# Patient Record
Sex: MALE | Race: WHITE | NOT HISPANIC OR LATINO | Employment: OTHER | URBAN - METROPOLITAN AREA
[De-identification: names, ages, dates, MRNs, and addresses within clinical notes are randomized per-mention and may not be internally consistent; named-entity substitution may affect disease eponyms.]

---

## 2019-11-11 ENCOUNTER — HOSPITAL ENCOUNTER (INPATIENT)
Facility: HOSPITAL | Age: 44
LOS: 7 days | Discharge: HOME | DRG: 871 | End: 2019-11-18
Attending: EMERGENCY MEDICINE | Admitting: HOSPITALIST
Payer: MEDICAID

## 2019-11-11 DIAGNOSIS — A41.9 SEPSIS: ICD-10-CM

## 2019-11-11 DIAGNOSIS — R07.9 CHEST PAIN: ICD-10-CM

## 2019-11-11 DIAGNOSIS — A41.9 SEPSIS, DUE TO UNSPECIFIED ORGANISM, UNSPECIFIED WHETHER ACUTE ORGAN DYSFUNCTION PRESENT: ICD-10-CM

## 2019-11-11 DIAGNOSIS — R74.8 ELEVATED LIVER ENZYMES: ICD-10-CM

## 2019-11-11 DIAGNOSIS — J96.01 ACUTE HYPOXEMIC RESPIRATORY FAILURE: ICD-10-CM

## 2019-11-11 DIAGNOSIS — J18.9 PNEUMONIA OF LEFT LUNG DUE TO INFECTIOUS ORGANISM, UNSPECIFIED PART OF LUNG: ICD-10-CM

## 2019-11-11 DIAGNOSIS — R09.1 PLEURISY: ICD-10-CM

## 2019-11-11 DIAGNOSIS — Z72.0 TOBACCO ABUSE: ICD-10-CM

## 2019-11-11 DIAGNOSIS — R76.8 HEPATITIS C ANTIBODY POSITIVE IN BLOOD: ICD-10-CM

## 2019-11-11 DIAGNOSIS — R00.0 TACHYCARDIA: ICD-10-CM

## 2019-11-11 DIAGNOSIS — R65.21 SEPTIC SHOCK: Primary | ICD-10-CM

## 2019-11-11 DIAGNOSIS — A41.9 SEPTIC SHOCK: Primary | ICD-10-CM

## 2019-11-11 DIAGNOSIS — I38 ENDOCARDITIS: ICD-10-CM

## 2019-11-11 LAB
BASOPHILS # BLD AUTO: 0.08 K/UL (ref 0–0.2)
BASOPHILS NFR BLD: 0.4 % (ref 0–1.9)
CTP QC/QA: YES
DIFFERENTIAL METHOD: ABNORMAL
EOSINOPHIL # BLD AUTO: 0 K/UL (ref 0–0.5)
EOSINOPHIL NFR BLD: 0.1 % (ref 0–8)
ERYTHROCYTE [DISTWIDTH] IN BLOOD BY AUTOMATED COUNT: 14.5 % (ref 11.5–14.5)
HCT VFR BLD AUTO: 38.7 % (ref 40–54)
HGB BLD-MCNC: 13 G/DL (ref 14–18)
HIV1+2 IGG SERPL QL IA.RAPID: NEGATIVE
IMM GRANULOCYTES # BLD AUTO: 0.26 K/UL (ref 0–0.04)
IMM GRANULOCYTES NFR BLD AUTO: 1.2 % (ref 0–0.5)
LACTATE SERPL-SCNC: 3.2 MMOL/L (ref 0.5–2.2)
LYMPHOCYTES # BLD AUTO: 1.5 K/UL (ref 1–4.8)
LYMPHOCYTES NFR BLD: 6.9 % (ref 18–48)
MCH RBC QN AUTO: 29 PG (ref 27–31)
MCHC RBC AUTO-ENTMCNC: 33.6 G/DL (ref 32–36)
MCV RBC AUTO: 86 FL (ref 82–98)
MONOCYTES # BLD AUTO: 1.4 K/UL (ref 0.3–1)
MONOCYTES NFR BLD: 6.3 % (ref 4–15)
NEUTROPHILS # BLD AUTO: 18.3 K/UL (ref 1.8–7.7)
NEUTROPHILS NFR BLD: 85.1 % (ref 38–73)
NRBC BLD-RTO: 0 /100 WBC
PLATELET # BLD AUTO: 423 K/UL (ref 150–350)
PMV BLD AUTO: 11 FL (ref 9.2–12.9)
POC MOLECULAR INFLUENZA A AGN: NEGATIVE
POC MOLECULAR INFLUENZA B AGN: NEGATIVE
RBC # BLD AUTO: 4.49 M/UL (ref 4.6–6.2)
TROPONIN I SERPL DL<=0.01 NG/ML-MCNC: <0.006 NG/ML (ref 0–0.03)
WBC # BLD AUTO: 21.51 K/UL (ref 3.9–12.7)

## 2019-11-11 PROCEDURE — 99291 CRITICAL CARE FIRST HOUR: CPT | Mod: ,,, | Performed by: EMERGENCY MEDICINE

## 2019-11-11 PROCEDURE — 96366 THER/PROPH/DIAG IV INF ADDON: CPT

## 2019-11-11 PROCEDURE — 84484 ASSAY OF TROPONIN QUANT: CPT

## 2019-11-11 PROCEDURE — 96375 TX/PRO/DX INJ NEW DRUG ADDON: CPT

## 2019-11-11 PROCEDURE — 93010 ELECTROCARDIOGRAM REPORT: CPT | Mod: ,,, | Performed by: INTERNAL MEDICINE

## 2019-11-11 PROCEDURE — 96365 THER/PROPH/DIAG IV INF INIT: CPT

## 2019-11-11 PROCEDURE — 85025 COMPLETE CBC W/AUTO DIFF WBC: CPT

## 2019-11-11 PROCEDURE — 87040 BLOOD CULTURE FOR BACTERIA: CPT | Mod: 59

## 2019-11-11 PROCEDURE — 25000003 PHARM REV CODE 250: Performed by: PHYSICIAN ASSISTANT

## 2019-11-11 PROCEDURE — 93010 EKG 12-LEAD: ICD-10-PCS | Mod: ,,, | Performed by: INTERNAL MEDICINE

## 2019-11-11 PROCEDURE — 83605 ASSAY OF LACTIC ACID: CPT

## 2019-11-11 PROCEDURE — 86703 HIV-1/HIV-2 1 RESULT ANTBDY: CPT

## 2019-11-11 PROCEDURE — 63600175 PHARM REV CODE 636 W HCPCS: Performed by: PHYSICIAN ASSISTANT

## 2019-11-11 PROCEDURE — 93005 ELECTROCARDIOGRAM TRACING: CPT

## 2019-11-11 PROCEDURE — 96361 HYDRATE IV INFUSION ADD-ON: CPT

## 2019-11-11 PROCEDURE — 96368 THER/DIAG CONCURRENT INF: CPT

## 2019-11-11 PROCEDURE — 87502 INFLUENZA DNA AMP PROBE: CPT

## 2019-11-11 PROCEDURE — 99291 CRITICAL CARE FIRST HOUR: CPT | Mod: 25

## 2019-11-11 PROCEDURE — 12000002 HC ACUTE/MED SURGE SEMI-PRIVATE ROOM

## 2019-11-11 PROCEDURE — 99291 PR CRITICAL CARE, E/M 30-74 MINUTES: ICD-10-PCS | Mod: ,,, | Performed by: EMERGENCY MEDICINE

## 2019-11-11 RX ORDER — KETOROLAC TROMETHAMINE 30 MG/ML
10 INJECTION, SOLUTION INTRAMUSCULAR; INTRAVENOUS
Status: COMPLETED | OUTPATIENT
Start: 2019-11-11 | End: 2019-11-11

## 2019-11-11 RX ORDER — ONDANSETRON 2 MG/ML
4 INJECTION INTRAMUSCULAR; INTRAVENOUS
Status: COMPLETED | OUTPATIENT
Start: 2019-11-11 | End: 2019-11-11

## 2019-11-11 RX ORDER — VANCOMYCIN HCL IN 5 % DEXTROSE 1G/250ML
15 PLASTIC BAG, INJECTION (ML) INTRAVENOUS
Status: COMPLETED | OUTPATIENT
Start: 2019-11-11 | End: 2019-11-12

## 2019-11-11 RX ORDER — CEFEPIME HYDROCHLORIDE 2 G/1
2 INJECTION, POWDER, FOR SOLUTION INTRAVENOUS
Status: COMPLETED | OUTPATIENT
Start: 2019-11-11 | End: 2019-11-11

## 2019-11-11 RX ORDER — ACETAMINOPHEN 500 MG
1000 TABLET ORAL
Status: COMPLETED | OUTPATIENT
Start: 2019-11-11 | End: 2019-11-11

## 2019-11-11 RX ADMIN — ONDANSETRON 4 MG: 2 INJECTION INTRAMUSCULAR; INTRAVENOUS at 10:11

## 2019-11-11 RX ADMIN — KETOROLAC TROMETHAMINE 10 MG: 30 INJECTION, SOLUTION INTRAMUSCULAR; INTRAVENOUS at 10:11

## 2019-11-11 RX ADMIN — CEFEPIME 2 G: 2 INJECTION, POWDER, FOR SOLUTION INTRAVENOUS at 11:11

## 2019-11-11 RX ADMIN — ACETAMINOPHEN 1000 MG: 500 TABLET ORAL at 10:11

## 2019-11-11 RX ADMIN — SODIUM CHLORIDE 1770 ML: 0.9 INJECTION, SOLUTION INTRAVENOUS at 10:11

## 2019-11-11 RX ADMIN — VANCOMYCIN HYDROCHLORIDE 1000 MG: 1 INJECTION, POWDER, LYOPHILIZED, FOR SOLUTION INTRAVENOUS at 11:11

## 2019-11-12 PROBLEM — A41.9 SEPSIS: Status: ACTIVE | Noted: 2019-11-12

## 2019-11-12 PROBLEM — R65.21 SEPTIC SHOCK: Status: ACTIVE | Noted: 2019-11-12

## 2019-11-12 PROBLEM — A41.9 SEPTIC SHOCK: Status: ACTIVE | Noted: 2019-11-12

## 2019-11-12 LAB
ADENOVIRUS: NOT DETECTED
ALBUMIN SERPL BCP-MCNC: 1.7 G/DL (ref 3.5–5.2)
ALBUMIN SERPL BCP-MCNC: 1.7 G/DL (ref 3.5–5.2)
ALP SERPL-CCNC: 140 U/L (ref 55–135)
ALP SERPL-CCNC: 173 U/L (ref 55–135)
ALT SERPL W/O P-5'-P-CCNC: 68 U/L (ref 10–44)
ALT SERPL W/O P-5'-P-CCNC: 96 U/L (ref 10–44)
ANION GAP SERPL CALC-SCNC: 10 MMOL/L (ref 8–16)
ANION GAP SERPL CALC-SCNC: 9 MMOL/L (ref 8–16)
ANISOCYTOSIS BLD QL SMEAR: SLIGHT
ASCENDING AORTA: 3.32 CM
AST SERPL-CCNC: 107 U/L (ref 10–40)
AST SERPL-CCNC: 36 U/L (ref 10–40)
AV INDEX (PROSTH): 1.09
AV MEAN GRADIENT: 4 MMHG
AV PEAK GRADIENT: 8 MMHG
AV VALVE AREA: 3.93 CM2
AV VELOCITY RATIO: 0.83
BASOPHILS # BLD AUTO: 0.08 K/UL (ref 0–0.2)
BASOPHILS NFR BLD: 0.3 % (ref 0–1.9)
BILIRUB SERPL-MCNC: 0.6 MG/DL (ref 0.1–1)
BILIRUB SERPL-MCNC: 0.9 MG/DL (ref 0.1–1)
BILIRUB UR QL STRIP: NEGATIVE
BORDETELLA PARAPERTUSSIS (IS1001): NOT DETECTED
BORDETELLA PERTUSSIS (PTXP): NOT DETECTED
BSA FOR ECHO PROCEDURE: 1.64 M2
BUN SERPL-MCNC: 10 MG/DL (ref 6–20)
BUN SERPL-MCNC: 7 MG/DL (ref 6–20)
BURR CELLS BLD QL SMEAR: ABNORMAL
CALCIUM SERPL-MCNC: 7.6 MG/DL (ref 8.7–10.5)
CALCIUM SERPL-MCNC: 8 MG/DL (ref 8.7–10.5)
CHLAMYDIA PNEUMONIAE: NOT DETECTED
CHLORIDE SERPL-SCNC: 104 MMOL/L (ref 95–110)
CHLORIDE SERPL-SCNC: 99 MMOL/L (ref 95–110)
CLARITY UR REFRACT.AUTO: CLEAR
CO2 SERPL-SCNC: 21 MMOL/L (ref 23–29)
CO2 SERPL-SCNC: 21 MMOL/L (ref 23–29)
COLOR UR AUTO: YELLOW
CORONAVIRUS 229E, COMMON COLD VIRUS: NOT DETECTED
CORONAVIRUS HKU1, COMMON COLD VIRUS: NOT DETECTED
CORONAVIRUS NL63, COMMON COLD VIRUS: NOT DETECTED
CORONAVIRUS OC43, COMMON COLD VIRUS: NOT DETECTED
CREAT SERPL-MCNC: 0.7 MG/DL (ref 0.5–1.4)
CREAT SERPL-MCNC: 0.7 MG/DL (ref 0.5–1.4)
CV ECHO LV RWT: 0.34 CM
DIFFERENTIAL METHOD: ABNORMAL
DOP CALC AO PEAK VEL: 1.37 M/S
DOP CALC AO VTI: 17.25 CM
DOP CALC LVOT AREA: 3.6 CM2
DOP CALC LVOT DIAMETER: 2.14 CM
DOP CALC LVOT PEAK VEL: 1.14 M/S
DOP CALC LVOT STROKE VOLUME: 67.84 CM3
DOP CALCLVOT PEAK VEL VTI: 18.87 CM
E WAVE DECELERATION TIME: 146.21 MSEC
E/A RATIO: 1
E/E' RATIO: 6.93 M/S
ECHO LV POSTERIOR WALL: 0.69 CM (ref 0.6–1.1)
EOSINOPHIL # BLD AUTO: 0.1 K/UL (ref 0–0.5)
EOSINOPHIL NFR BLD: 0.3 % (ref 0–8)
ERYTHROCYTE [DISTWIDTH] IN BLOOD BY AUTOMATED COUNT: 14.3 % (ref 11.5–14.5)
EST. GFR  (AFRICAN AMERICAN): >60 ML/MIN/1.73 M^2
EST. GFR  (AFRICAN AMERICAN): >60 ML/MIN/1.73 M^2
EST. GFR  (NON AFRICAN AMERICAN): >60 ML/MIN/1.73 M^2
EST. GFR  (NON AFRICAN AMERICAN): >60 ML/MIN/1.73 M^2
FLUBV RNA NPH QL NAA+NON-PROBE: NOT DETECTED
FRACTIONAL SHORTENING: 28 % (ref 28–44)
GLUCOSE SERPL-MCNC: 116 MG/DL (ref 70–110)
GLUCOSE SERPL-MCNC: 119 MG/DL (ref 70–110)
GLUCOSE UR QL STRIP: NEGATIVE
HCT VFR BLD AUTO: 38.1 % (ref 40–54)
HGB BLD-MCNC: 12.4 G/DL (ref 14–18)
HGB UR QL STRIP: NEGATIVE
HPIV1 RNA NPH QL NAA+NON-PROBE: NOT DETECTED
HPIV2 RNA NPH QL NAA+NON-PROBE: NOT DETECTED
HPIV3 RNA NPH QL NAA+NON-PROBE: NOT DETECTED
HPIV4 RNA NPH QL NAA+NON-PROBE: NOT DETECTED
HUMAN METAPNEUMOVIRUS: NOT DETECTED
IMM GRANULOCYTES # BLD AUTO: 0.33 K/UL (ref 0–0.04)
IMM GRANULOCYTES NFR BLD AUTO: 1.3 % (ref 0–0.5)
INFLUENZA A (SUBTYPES H1,H1-2009,H3): NOT DETECTED
INTERVENTRICULAR SEPTUM: 0.71 CM (ref 0.6–1.1)
KETONES UR QL STRIP: NEGATIVE
LA MAJOR: 4.53 CM
LA MINOR: 4.62 CM
LA WIDTH: 3.83 CM
LACTATE SERPL-SCNC: 2 MMOL/L (ref 0.5–2.2)
LEFT ATRIUM SIZE: 3.21 CM
LEFT ATRIUM VOLUME INDEX: 29 ML/M2
LEFT ATRIUM VOLUME: 47.8 CM3
LEFT INTERNAL DIMENSION IN SYSTOLE: 2.92 CM (ref 2.1–4)
LEFT VENTRICLE DIASTOLIC VOLUME INDEX: 44.34 ML/M2
LEFT VENTRICLE DIASTOLIC VOLUME: 73.03 ML
LEFT VENTRICLE MASS INDEX: 49 G/M2
LEFT VENTRICLE SYSTOLIC VOLUME INDEX: 20 ML/M2
LEFT VENTRICLE SYSTOLIC VOLUME: 32.88 ML
LEFT VENTRICULAR INTERNAL DIMENSION IN DIASTOLE: 4.07 CM (ref 3.5–6)
LEFT VENTRICULAR MASS: 80.68 G
LEUKOCYTE ESTERASE UR QL STRIP: NEGATIVE
LV LATERAL E/E' RATIO: 6.06 M/S
LV SEPTAL E/E' RATIO: 8.08 M/S
LYMPHOCYTES # BLD AUTO: 1.6 K/UL (ref 1–4.8)
LYMPHOCYTES NFR BLD: 6.3 % (ref 18–48)
MCH RBC QN AUTO: 28.4 PG (ref 27–31)
MCHC RBC AUTO-ENTMCNC: 32.5 G/DL (ref 32–36)
MCV RBC AUTO: 87 FL (ref 82–98)
MONOCYTES # BLD AUTO: 1.4 K/UL (ref 0.3–1)
MONOCYTES NFR BLD: 5.6 % (ref 4–15)
MV PEAK A VEL: 0.97 M/S
MV PEAK E VEL: 0.97 M/S
MYCOPLASMA PNEUMONIAE: NOT DETECTED
NEUTROPHILS # BLD AUTO: 21.6 K/UL (ref 1.8–7.7)
NEUTROPHILS NFR BLD: 86.2 % (ref 38–73)
NITRITE UR QL STRIP: NEGATIVE
NRBC BLD-RTO: 0 /100 WBC
OVALOCYTES BLD QL SMEAR: ABNORMAL
PH UR STRIP: 6 [PH] (ref 5–8)
PISA TR MAX VEL: 1.74 M/S
PLATELET # BLD AUTO: 421 K/UL (ref 150–350)
PLATELET BLD QL SMEAR: ABNORMAL
PMV BLD AUTO: 9.1 FL (ref 9.2–12.9)
POIKILOCYTOSIS BLD QL SMEAR: SLIGHT
POTASSIUM SERPL-SCNC: 3 MMOL/L (ref 3.5–5.1)
POTASSIUM SERPL-SCNC: 3.7 MMOL/L (ref 3.5–5.1)
PROT SERPL-MCNC: 6 G/DL (ref 6–8.4)
PROT SERPL-MCNC: 6.1 G/DL (ref 6–8.4)
PROT UR QL STRIP: NEGATIVE
PULM VEIN S/D RATIO: 0.8
PV PEAK D VEL: 0.98 M/S
PV PEAK S VEL: 0.78 M/S
RA MAJOR: 3.12 CM
RA PRESSURE: 3 MMHG
RA WIDTH: 3.27 CM
RBC # BLD AUTO: 4.37 M/UL (ref 4.6–6.2)
RESPIRATORY INFECTION PANEL SOURCE: NORMAL
RIGHT VENTRICULAR END-DIASTOLIC DIMENSION: 3.59 CM
RSV RNA NPH QL NAA+NON-PROBE: NOT DETECTED
RV TISSUE DOPPLER FREE WALL SYSTOLIC VELOCITY 1 (APICAL 4 CHAMBER VIEW): 17.1 CM/S
RV+EV RNA NPH QL NAA+NON-PROBE: NOT DETECTED
SINUS: 3.27 CM
SODIUM SERPL-SCNC: 130 MMOL/L (ref 136–145)
SODIUM SERPL-SCNC: 134 MMOL/L (ref 136–145)
SP GR UR STRIP: 1.01 (ref 1–1.03)
STJ: 2.77 CM
TDI LATERAL: 0.16 M/S
TDI SEPTAL: 0.12 M/S
TDI: 0.14 M/S
TOXIC GRANULES BLD QL SMEAR: PRESENT
TR MAX PG: 12 MMHG
TRICUSPID ANNULAR PLANE SYSTOLIC EXCURSION: 2.85 CM
TROPONIN I SERPL DL<=0.01 NG/ML-MCNC: <0.006 NG/ML (ref 0–0.03)
TV REST PULMONARY ARTERY PRESSURE: 15 MMHG
URN SPEC COLLECT METH UR: NORMAL
WBC # BLD AUTO: 25.06 K/UL (ref 3.9–12.7)
WBC TOXIC VACUOLES BLD QL SMEAR: PRESENT

## 2019-11-12 PROCEDURE — 63600175 PHARM REV CODE 636 W HCPCS: Performed by: STUDENT IN AN ORGANIZED HEALTH CARE EDUCATION/TRAINING PROGRAM

## 2019-11-12 PROCEDURE — 87449 NOS EACH ORGANISM AG IA: CPT

## 2019-11-12 PROCEDURE — 63600175 PHARM REV CODE 636 W HCPCS: Performed by: INTERNAL MEDICINE

## 2019-11-12 PROCEDURE — 87798 DETECT AGENT NOS DNA AMP: CPT

## 2019-11-12 PROCEDURE — 85025 COMPLETE CBC W/AUTO DIFF WBC: CPT

## 2019-11-12 PROCEDURE — 94761 N-INVAS EAR/PLS OXIMETRY MLT: CPT

## 2019-11-12 PROCEDURE — 63600175 PHARM REV CODE 636 W HCPCS: Performed by: PHYSICIAN ASSISTANT

## 2019-11-12 PROCEDURE — 99233 SBSQ HOSP IP/OBS HIGH 50: CPT | Mod: ,,, | Performed by: INTERNAL MEDICINE

## 2019-11-12 PROCEDURE — 87116 MYCOBACTERIA CULTURE: CPT

## 2019-11-12 PROCEDURE — 84484 ASSAY OF TROPONIN QUANT: CPT

## 2019-11-12 PROCEDURE — 27000221 HC OXYGEN, UP TO 24 HOURS

## 2019-11-12 PROCEDURE — 83605 ASSAY OF LACTIC ACID: CPT

## 2019-11-12 PROCEDURE — 80053 COMPREHEN METABOLIC PANEL: CPT | Mod: 91

## 2019-11-12 PROCEDURE — 25000003 PHARM REV CODE 250: Performed by: STUDENT IN AN ORGANIZED HEALTH CARE EDUCATION/TRAINING PROGRAM

## 2019-11-12 PROCEDURE — 20600001 HC STEP DOWN PRIVATE ROOM

## 2019-11-12 PROCEDURE — 87015 SPECIMEN INFECT AGNT CONCNTJ: CPT

## 2019-11-12 PROCEDURE — 87899 AGENT NOS ASSAY W/OPTIC: CPT

## 2019-11-12 PROCEDURE — 25000003 PHARM REV CODE 250: Performed by: PHYSICIAN ASSISTANT

## 2019-11-12 PROCEDURE — 86480 TB TEST CELL IMMUN MEASURE: CPT

## 2019-11-12 PROCEDURE — 81003 URINALYSIS AUTO W/O SCOPE: CPT

## 2019-11-12 PROCEDURE — 87206 SMEAR FLUORESCENT/ACID STAI: CPT

## 2019-11-12 PROCEDURE — 99233 PR SUBSEQUENT HOSPITAL CARE,LEVL III: ICD-10-PCS | Mod: ,,, | Performed by: INTERNAL MEDICINE

## 2019-11-12 PROCEDURE — 87040 BLOOD CULTURE FOR BACTERIA: CPT

## 2019-11-12 PROCEDURE — 80053 COMPREHEN METABOLIC PANEL: CPT

## 2019-11-12 RX ORDER — ACETAMINOPHEN 325 MG/1
650 TABLET ORAL EVERY 4 HOURS PRN
Status: DISCONTINUED | OUTPATIENT
Start: 2019-11-12 | End: 2019-11-13

## 2019-11-12 RX ORDER — POTASSIUM CHLORIDE 20 MEQ/15ML
40 SOLUTION ORAL
Status: COMPLETED | OUTPATIENT
Start: 2019-11-12 | End: 2019-11-12

## 2019-11-12 RX ORDER — SODIUM CHLORIDE 0.9 % (FLUSH) 0.9 %
10 SYRINGE (ML) INJECTION
Status: DISCONTINUED | OUTPATIENT
Start: 2019-11-12 | End: 2019-11-18 | Stop reason: HOSPADM

## 2019-11-12 RX ORDER — RAMELTEON 8 MG/1
8 TABLET ORAL NIGHTLY PRN
Status: DISCONTINUED | OUTPATIENT
Start: 2019-11-12 | End: 2019-11-18 | Stop reason: HOSPADM

## 2019-11-12 RX ORDER — KETOROLAC TROMETHAMINE 15 MG/ML
15 INJECTION, SOLUTION INTRAMUSCULAR; INTRAVENOUS EVERY 6 HOURS PRN
Status: DISCONTINUED | OUTPATIENT
Start: 2019-11-12 | End: 2019-11-12 | Stop reason: SDUPTHER

## 2019-11-12 RX ORDER — SODIUM CHLORIDE 0.9 % (FLUSH) 0.9 %
10 SYRINGE (ML) INJECTION
Status: CANCELLED | OUTPATIENT
Start: 2019-11-12

## 2019-11-12 RX ORDER — AZITHROMYCIN 250 MG/1
500 TABLET, FILM COATED ORAL NIGHTLY
Status: COMPLETED | OUTPATIENT
Start: 2019-11-12 | End: 2019-11-13

## 2019-11-12 RX ORDER — VANCOMYCIN HCL IN 5 % DEXTROSE 1G/250ML
1000 PLASTIC BAG, INJECTION (ML) INTRAVENOUS
Status: DISCONTINUED | OUTPATIENT
Start: 2019-11-12 | End: 2019-11-14

## 2019-11-12 RX ORDER — ONDANSETRON 2 MG/ML
4 INJECTION INTRAMUSCULAR; INTRAVENOUS EVERY 12 HOURS PRN
Status: DISCONTINUED | OUTPATIENT
Start: 2019-11-12 | End: 2019-11-18 | Stop reason: HOSPADM

## 2019-11-12 RX ORDER — KETOROLAC TROMETHAMINE 15 MG/ML
15 INJECTION, SOLUTION INTRAMUSCULAR; INTRAVENOUS EVERY 6 HOURS PRN
Status: DISPENSED | OUTPATIENT
Start: 2019-11-12 | End: 2019-11-15

## 2019-11-12 RX ORDER — ONDANSETRON 8 MG/1
8 TABLET, ORALLY DISINTEGRATING ORAL EVERY 8 HOURS PRN
Status: DISCONTINUED | OUTPATIENT
Start: 2019-11-12 | End: 2019-11-18 | Stop reason: HOSPADM

## 2019-11-12 RX ORDER — NOREPINEPHRINE BITARTRATE/D5W 4MG/250ML
0.05 PLASTIC BAG, INJECTION (ML) INTRAVENOUS CONTINUOUS
Status: DISCONTINUED | OUTPATIENT
Start: 2019-11-12 | End: 2019-11-12

## 2019-11-12 RX ORDER — ENOXAPARIN SODIUM 100 MG/ML
40 INJECTION SUBCUTANEOUS EVERY 24 HOURS
Status: DISCONTINUED | OUTPATIENT
Start: 2019-11-12 | End: 2019-11-18 | Stop reason: HOSPADM

## 2019-11-12 RX ORDER — CEFEPIME HYDROCHLORIDE 2 G/1
2 INJECTION, POWDER, FOR SOLUTION INTRAVENOUS
Status: DISCONTINUED | OUTPATIENT
Start: 2019-11-12 | End: 2019-11-14

## 2019-11-12 RX ADMIN — CEFEPIME 2 G: 2 INJECTION, POWDER, FOR SOLUTION INTRAVENOUS at 10:11

## 2019-11-12 RX ADMIN — VANCOMYCIN HYDROCHLORIDE 1000 MG: 1 INJECTION, POWDER, FOR SOLUTION INTRAVENOUS at 11:11

## 2019-11-12 RX ADMIN — ENOXAPARIN SODIUM 40 MG: 100 INJECTION SUBCUTANEOUS at 04:11

## 2019-11-12 RX ADMIN — KETOROLAC TROMETHAMINE 15 MG: 15 INJECTION, SOLUTION INTRAMUSCULAR; INTRAVENOUS at 05:11

## 2019-11-12 RX ADMIN — POTASSIUM CHLORIDE 40 MEQ: 20 SOLUTION ORAL at 08:11

## 2019-11-12 RX ADMIN — SODIUM CHLORIDE, SODIUM LACTATE, POTASSIUM CHLORIDE, AND CALCIUM CHLORIDE 1000 ML: .6; .31; .03; .02 INJECTION, SOLUTION INTRAVENOUS at 04:11

## 2019-11-12 RX ADMIN — AZITHROMYCIN 500 MG: 250 TABLET, FILM COATED ORAL at 09:11

## 2019-11-12 RX ADMIN — AZITHROMYCIN MONOHYDRATE 500 MG: 500 INJECTION, POWDER, LYOPHILIZED, FOR SOLUTION INTRAVENOUS at 12:11

## 2019-11-12 RX ADMIN — POTASSIUM CHLORIDE 40 MEQ: 20 SOLUTION ORAL at 05:11

## 2019-11-12 RX ADMIN — Medication 0.05 MCG/KG/MIN: at 02:11

## 2019-11-12 RX ADMIN — SODIUM CHLORIDE 1000 ML: 0.9 INJECTION, SOLUTION INTRAVENOUS at 12:11

## 2019-11-12 NOTE — NURSING
Patient connected to tele box and transported via wheelchair to room 8091. Pt remained stable throughout transfer. Pt placed in chair with call light in reach. Unit notified of patients arrival along with chart given to unit secretary. MALINA Khan assumed care of patient at this time.

## 2019-11-12 NOTE — RESIDENT HANDOFF
Handoff     Primary Team: [facility] CRITICAL CARE MEDICINE Room Number: [id number] CVICU/[id number] CVICU A     Patient Name: Hakeem Hunt MRN: [id number]     Date of Birth: [id number] Allergies: Pcn [penicillins]     Age: 44 y.o. Admit Date: 11/11/2019     Sex: male  BMI: Body mass index is 21.63 kg/m².     Code Status: Full Code        Illness Level (current clinical status): Watcher - No    Reason for Admission: Septic shock    Brief HPI   44-year-old male with no medical history presents to the ED complaining fever and flu-like symptoms for the past week.  + chills, cough productive of bloody sputum, shortness of breath, sore throat, dark-colored urine, unintentional weight loss, headache, lightheadedness, chest pain that is worse with coughing. No recent travels or known sick contacts no flu vax. He smokes daily, socially drinks alcohol, recreationally uses drugs, last IVDU 2 weeks ago.     In ED, leukocytosis and febrile. Rapid HIV neg. CXR concerning for CECILIA pneumonia. Also, hyponatremic.  Sepsis workup initiated with 30cc/kg IVF, blood cultures, and started on  vancomycin, cefepime, azithromycin (legionella coverage).  levo for  hypotension. MICU was consulted for septic shock.     Hospital Course   Patient admitted for sepsis likely 2/2 to pneumonia. TTE negative, no vegetations. BCx with NGTD. Quant gold, AFB culture/smear, resp panel, and  Legionella labs - all pending. Patient was weaned off levophed at 5AM on 11/12. Continued on broad spec abx and stepped down for further care.     Tasks   Follow up on above listed labs/micro     Estimated Discharge Date: per hospital medicine team     Discharge Disposition: Home or Self Care    Mentored By: Dr. Dietz

## 2019-11-12 NOTE — ED TRIAGE NOTES
Pt is a 44 yr old male that presents to the ED today with complaints of a productive cough, SOB, congestion, chest pain hurting worse with deep breathing/coughing and fevers since last week. Last ibuprofen at 1800 tonight. Pt reports being a smoker. Denies chest pain.

## 2019-11-12 NOTE — SUBJECTIVE & OBJECTIVE
"History reviewed. No pertinent past medical history.    Past Surgical History:   Procedure Laterality Date    HERNIA REPAIR         Review of patient's allergies indicates:   Allergen Reactions    Pcn [penicillins] Rash       Family History     None        Tobacco Use    Smoking status: Current Every Day Smoker     Packs/day: 1.00     Types: Cigarettes   Substance and Sexual Activity    Alcohol use: Yes     Comment: 2 beers after work    Drug use: Yes     Types: Marijuana, Cocaine, IV     Comment: "play around with just about all of it"    Sexual activity: Not on file      Review of Systems   Constitutional: Positive for chills, fever and unexpected weight change.   HENT: Positive for sore throat.    Respiratory: Positive for cough.    Cardiovascular: Positive for chest pain.   Gastrointestinal: Negative for abdominal pain, diarrhea and vomiting.   Genitourinary: Negative for dysuria.   Musculoskeletal: Positive for myalgias.   Skin: Negative for rash.   Neurological: Negative for headaches.     Objective:     Vital Signs (Most Recent):  Temp: 99.1 °F (37.3 °C) (11/12/19 0715)  Pulse: (!) 126 (11/12/19 0730)  Resp: (!) 30 (11/12/19 0730)  BP: (!) 104/58 (11/12/19 0730)  SpO2: (!) 94 % (11/12/19 0730) Vital Signs (24h Range):  Temp:  [97.4 °F (36.3 °C)-101.2 °F (38.4 °C)] 99.1 °F (37.3 °C)  Pulse:  [] 126  Resp:  [17-46] 30  SpO2:  [90 %-99 %] 94 %  BP: ()/(41-77) 104/58   Weight: 59 kg (130 lb)  Body mass index is 21.63 kg/m².      Intake/Output Summary (Last 24 hours) at 11/12/2019 0743  Last data filed at 11/12/2019 0700  Gross per 24 hour   Intake 1900 ml   Output 275 ml   Net 1625 ml       Physical Exam   Constitutional: He is oriented to person, place, and time. He appears ill. He appears distressed.   HENT:   Head: Normocephalic and atraumatic.   Eyes: Pupils are equal, round, and reactive to light. EOM are normal. Right eye exhibits no discharge. Left eye exhibits no discharge.   Pinpoint " pupils   Neck: Normal range of motion.   Cardiovascular: Regular rhythm and intact distal pulses.   tachycardic   Pulmonary/Chest: Effort normal. No respiratory distress.   Diminished breath sounds bilaterally, worse on left   Abdominal: Soft. He exhibits no distension. There is no tenderness.   Musculoskeletal: Normal range of motion. He exhibits no edema or deformity.   Neurological: He is oriented to person, place, and time.   Drowsy but arousable   Skin: Skin is warm and dry. He is not diaphoretic.       Vents:     Lines/Drains/Airways     Peripheral Intravenous Line                 Peripheral IV - Single Lumen 11/11/19 2200 20 G Right Upper Arm less than 1 day         Peripheral IV - Single Lumen 11/12/19 0000 20 G Right Hand less than 1 day              Significant Labs:    CBC/Anemia Profile:  Recent Labs   Lab 11/11/19 2242 11/12/19  0333   WBC 21.51* 25.06*   HGB 13.0* 12.4*   HCT 38.7* 38.1*   * 421*   MCV 86 87   RDW 14.5 14.3        Chemistries:  Recent Labs   Lab 11/12/19  0000 11/12/19  0333   * 134*   K 3.7 3.0*   CL 99 104   CO2 21* 21*   BUN 10 7   CREATININE 0.7 0.7   CALCIUM 7.6* 8.0*   ALBUMIN 1.7* 1.7*   PROT 6.0 6.1   BILITOT 0.9 0.6   ALKPHOS 140* 173*   ALT 68* 96*   AST 36 107*       Lactic Acid:   Recent Labs   Lab 11/11/19 2242 11/12/19  0247   LACTATE 3.2* 2.0       Significant Imaging: I have reviewed all pertinent imaging results/findings within the past 24 hours.

## 2019-11-12 NOTE — PLAN OF CARE
Plan of care reviewed with patient and friend. Patient AAOx4 follows all commands and moves all extremities. Sinus Tach 100-130, MD aware. Room Air with O2 >92%. Levo gtt off, MAP stable > 65. No skin breakdown noted on pressure points when transferred to unit. Regular diet. Patient received pain medication in ED before transfer for Pain management. Will continue to monitor patient.

## 2019-11-12 NOTE — ED PROVIDER NOTES
"Encounter Date: 11/11/2019       History     Chief Complaint   Patient presents with    Chest Pain     Pt c/o productive cough, SOB, congestion, chest pain hurting worse with deep breathing/coughing and fevers since last week. Last ibuprofen at 1800 tonight. +smoker. Denies chest pain.      44-year-old white male with no medical history presents to the ED complaining fever and flu-like symptoms for the past week.  He reports fever at home with a T-max of 102.9° F, chills, cough productive of bloody sputum, shortness of breath, sore throat, dark-colored urine, unintentional weight loss, headache, lightheadedness, chest pain that is worse with coughing.  He has been taking ibuprofen, Lortab, NyQuil with no relief.  He denies any sick contacts.  He has not had a flu vaccine this year.  He denies nausea, vomiting, diarrhea, abdominal pain, dysuria.  He smokes daily, socially drinks alcohol, recreationally uses drugs, last IVDU 2 weeks ago.    The history is provided by the patient.     Review of patient's allergies indicates:   Allergen Reactions    Pcn [penicillins] Rash     History reviewed. No pertinent past medical history.  Past Surgical History:   Procedure Laterality Date    HERNIA REPAIR       No family history on file.  Social History     Tobacco Use    Smoking status: Current Every Day Smoker     Packs/day: 1.00     Types: Cigarettes   Substance Use Topics    Alcohol use: Yes     Comment: 2 beers after work    Drug use: Yes     Types: Marijuana, Cocaine, IV     Comment: "play around with just about all of it"     Review of Systems   Constitutional: Positive for appetite change, chills, fatigue, fever and unexpected weight change (weight loss).   HENT: Positive for sore throat. Negative for congestion, postnasal drip and rhinorrhea.    Eyes: Negative for photophobia and visual disturbance.   Respiratory: Positive for cough and shortness of breath.    Cardiovascular: Positive for chest pain. "   Gastrointestinal: Negative for abdominal pain, constipation, diarrhea, nausea and vomiting.   Genitourinary: Negative for hematuria.        +dark colored urine   Musculoskeletal: Positive for myalgias.   Skin: Positive for rash.   Neurological: Positive for light-headedness and headaches. Negative for syncope and numbness.   Psychiatric/Behavioral: Negative for confusion.       Physical Exam     Initial Vitals [11/11/19 2000]   BP Pulse Resp Temp SpO2   121/77 110 18 97.4 °F (36.3 °C) 95 %      MAP       --         Physical Exam    Nursing note and vitals reviewed.  Constitutional: He appears well-developed. He is not diaphoretic. He appears ill.   HENT:   Head: Normocephalic and atraumatic.   Neck: Normal range of motion. Neck supple.   Cardiovascular: Regular rhythm and normal heart sounds. Tachycardia present.  Exam reveals no gallop and no friction rub.    No murmur heard.  Pulmonary/Chest: Breath sounds normal. Tachypnea noted. He has no wheezes. He has no rhonchi. He has no rales. He exhibits no tenderness.   Abdominal: Soft. Bowel sounds are normal. There is no tenderness. There is no rebound and no guarding.   Musculoskeletal: Normal range of motion.   Neurological: He is alert and oriented to person, place, and time.   Skin: Skin is warm and dry. Rash noted. No erythema.   Psychiatric: He has a normal mood and affect.         ED Course   Critical Care  Date/Time: 11/12/2019 1:01 AM  Performed by: Polina Fleming PA-C  Authorized by: Will Seo MD   Direct patient critical care time: 15 minutes  Additional history critical care time: 5 minutes  Ordering / reviewing critical care time: 10 minutes  Documentation critical care time: 5 minutes  Consulting other physicians critical care time: 5 minutes  Total critical care time (exclusive of procedural time) : 40 minutes  Critical care was necessary to treat or prevent imminent or life-threatening deterioration of the following conditions:  sepsis.        Labs Reviewed   CBC W/ AUTO DIFFERENTIAL - Abnormal; Notable for the following components:       Result Value    WBC 21.51 (*)     RBC 4.49 (*)     Hemoglobin 13.0 (*)     Hematocrit 38.7 (*)     Platelets 423 (*)     Immature Granulocytes 1.2 (*)     Gran # (ANC) 18.3 (*)     Immature Grans (Abs) 0.26 (*)     Mono # 1.4 (*)     Gran% 85.1 (*)     Lymph% 6.9 (*)     All other components within normal limits   LACTIC ACID, PLASMA - Abnormal; Notable for the following components:    Lactate (Lactic Acid) 3.2 (*)     All other components within normal limits   COMPREHENSIVE METABOLIC PANEL - Abnormal; Notable for the following components:    Sodium 130 (*)     CO2 21 (*)     Glucose 116 (*)     Calcium 7.6 (*)     Albumin 1.7 (*)     Alkaline Phosphatase 140 (*)     ALT 68 (*)     All other components within normal limits   CULTURE, BLOOD   CULTURE, BLOOD   TROPONIN I   RAPID HIV   URINALYSIS, REFLEX TO URINE CULTURE   POCT INFLUENZA A/B MOLECULAR          Imaging Results          X-Ray Chest PA And Lateral (Final result)  Result time 11/11/19 23:48:39    Final result by Malik Coffman MD (11/11/19 23:48:39)                 Impression:      Confluent consolidation of much of the left upper lobe likely is pneumonia.  Recommend follow-up to show complete resolution.      Electronically signed by: Malik Coffman  Date:    11/11/2019  Time:    23:48             Narrative:    EXAMINATION:  XR CHEST PA AND LATERAL    CLINICAL HISTORY:  cough, shortness of breath, fever;    TECHNIQUE:  PA and lateral views of the chest were performed.    COMPARISON:  None    FINDINGS:  Frontal lateral views presented.  There is diffuse dense opacity of the left upper lobe.  This likely represents pneumonia, however, differential includes infarct, contusion, hemorrhage, tumor.  No pneumothorax or pleural effusion.  No definite interstitial edema.  Heart is normal size.  Trachea appears normal.  No abdominal free air.  There  are overlying leads.                                 Medical Decision Making:   History:   Old Medical Records: I decided to obtain old medical records.  Clinical Tests:   Lab Tests: Ordered and Reviewed  Radiological Study: Ordered and Reviewed  Sepsis Perfusion Assessment: I attest, a sepsis perfusion exam was performed within 6 hours of Septic Shock presentation, following fluid resuscitation.  Other:   I have discussed this case with another health care provider.       <> Summary of the Discussion: Hospital Medicine       APC / Resident Notes:   44-year-old white male with no medical history presents to the ED complaining fever and flu-like symptoms for the past week.  Tachycardic.  Patient is ill-appearing.  Lungs are clear.  Abdomen is soft and nontender. Differential diagnosis includes but is not limited to sepsis, HIV, pneumonia, UTI, endocarditis, influenza, dehydration, acute kidney injury, electrolyte abnormality.  Will give IV fluids, antibiotics, check labs and chest x-ray.    Leukocytosis noted with WBC of 21.51.  Rapid HIV negative.  Troponin within normal limits. Influenza negative. Creatinine normal. Blood culture x2 pending.    Chest x-ray independently reviewed, left-sided pneumonia.  This is likely the source of sepsis.    Patient given IV fluids, vancomycin, cefepime, azithromycin.  After 30 cc/kilos bolus, BP 91/48.    Discussed with hospital medicine, Dr Ching.  Will give another L of fluids.  If BP improves, will admit to Hospital Medicine.  If remains hypotensive, will need ICU consult and likely pressors.    Patient signed out to LARRY Mark pending re-eval after another L IVF. Admit to hospital medicine vs ICU.                             Clinical Impression:       ICD-10-CM ICD-9-CM   1. Sepsis, due to unspecified organism, unspecified whether acute organ dysfunction present A41.9 038.9     995.91   2. Chest pain R07.9 786.50   3. Tachycardia R00.0 785.0   4. Pneumonia of left  lung due to infectious organism, unspecified part of lung J18.9 486         Disposition:   Disposition: Admitted  Condition: Serious                     Polina Fleming PA-C  11/12/19 0101

## 2019-11-12 NOTE — ASSESSMENT & PLAN NOTE
-source likely pneumonia. CXR with diffuse dense opacity of the left upper lobe  - Other etiologies include: TB, malignancy, infective endocarditis given his hx of IVDU   - Received vanc, zosyn, azithromycin in ED. Continue vanc, zosyn  - Receive 30cc/kg IVF in ED  - On levophed for pressure support; wean as tolerated  - F/U Quantiferon test  - F/u Blood cx x3, and respiratory panel  - consider echo to evaluate for vegetations  - UA negative for infection  - HIV negative  - Influenza negative

## 2019-11-12 NOTE — NURSING
Received patient from ICU via wheelchair, accompanied by Carmelo Sawyer RN and female visitor. AAOx4, in no apparent distress. Telemetry monitoring with Sinus tach noted. Patient oriented to unit.

## 2019-11-12 NOTE — CONSULTS
Patient seen and examined. Admit to MICU for septic shock.     Elba Calhoun M.D.  [facility] Pulmonary/Critical Care Fellow

## 2019-11-12 NOTE — NURSING
Bed 8091 ready, called report to nurse. Awaiting telemetry box and will transport patient. Will continue to monitor.

## 2019-11-12 NOTE — H&P
[facility]  Critical Care Medicine  History & Physical    Patient Name: Hakeem Hunt  MRN: [id number]  Admission Date: 11/11/2019  Hospital Length of Stay: 0 days  Code Status: Full Code  Attending Physician: Consuelo Dietz MD   Primary Care Provider: Spencer Rivera MD   Principal Problem: Septic shock    Subjective:     HPI:  44-year-old male with no medical history presents to the ED complaining fever and flu-like symptoms for the past week.  He reports fever at home with a T-max of 102.9° F, chills, cough productive of bloody sputum, shortness of breath, sore throat, dark-colored urine, unintentional weight loss, headache, lightheadedness, chest pain that is worse with coughing. No recent travels or known sick contacts.  He has not had a flu vaccine this year.  He denies nausea, vomiting, diarrhea, abdominal pain, dysuria.  He smokes daily, socially drinks alcohol, recreationally uses drugs, last IVDU 2 weeks ago.    In ED workup notable for WBC of 21.51, fever of 101.2.  Rapid HIV negative.  Troponin within normal limits. Influenza negative. Creatinine normal. Chest x-rayConfluent consolidation of much of the left upper lobe likely is pneumonia. Sepsis workup initiated with 30cc/kg IVF, blood cultures, and vancomycin, cefepime, azithromycin.  vaBlood culture x2 pending. Pt remained hypotensive after 30 cc/kilos bolus with BP 91/48. Levophed was started and MICU was consulted for septic shock.          Hospital/ICU Course:  No notes on file     History reviewed. No pertinent past medical history.    Past Surgical History:   Procedure Laterality Date    HERNIA REPAIR         Review of patient's allergies indicates:   Allergen Reactions    Pcn [penicillins] Rash       Family History     None        Tobacco Use    Smoking status: Current Every Day Smoker     Packs/day: 1.00     Types: Cigarettes   Substance and Sexual Activity    Alcohol use: Yes     Comment: 2 beers after work     "Drug use: Yes     Types: Marijuana, Cocaine, IV     Comment: "play around with just about all of it"    Sexual activity: Not on file      Review of Systems   Constitutional: Positive for chills, fever and unexpected weight change.   HENT: Positive for sore throat.    Respiratory: Positive for cough.    Cardiovascular: Positive for chest pain.   Gastrointestinal: Negative for abdominal pain, diarrhea and vomiting.   Genitourinary: Negative for dysuria.   Musculoskeletal: Positive for myalgias.   Skin: Negative for rash.   Neurological: Negative for headaches.     Objective:     Vital Signs (Most Recent):  Temp: 99.1 °F (37.3 °C) (11/12/19 0715)  Pulse: (!) 126 (11/12/19 0730)  Resp: (!) 30 (11/12/19 0730)  BP: (!) 104/58 (11/12/19 0730)  SpO2: (!) 94 % (11/12/19 0730) Vital Signs (24h Range):  Temp:  [97.4 °F (36.3 °C)-101.2 °F (38.4 °C)] 99.1 °F (37.3 °C)  Pulse:  [] 126  Resp:  [17-46] 30  SpO2:  [90 %-99 %] 94 %  BP: ()/(41-77) 104/58   Weight: 59 kg (130 lb)  Body mass index is 21.63 kg/m².      Intake/Output Summary (Last 24 hours) at 11/12/2019 0743  Last data filed at 11/12/2019 0700  Gross per 24 hour   Intake 1900 ml   Output 275 ml   Net 1625 ml       Physical Exam   Constitutional: He is oriented to person, place, and time. He appears ill. He appears distressed.   HENT:   Head: Normocephalic and atraumatic.   Eyes: Pupils are equal, round, and reactive to light. EOM are normal. Right eye exhibits no discharge. Left eye exhibits no discharge.   Pinpoint pupils   Neck: Normal range of motion.   Cardiovascular: Regular rhythm and intact distal pulses.   tachycardic   Pulmonary/Chest: Effort normal. No respiratory distress.   Diminished breath sounds bilaterally, worse on left   Abdominal: Soft. He exhibits no distension. There is no tenderness.   Musculoskeletal: Normal range of motion. He exhibits no edema or deformity.   Neurological: He is oriented to person, place, and time.   Drowsy but " arousable   Skin: Skin is warm and dry. He is not diaphoretic.       Vents:     Lines/Drains/Airways     Peripheral Intravenous Line                 Peripheral IV - Single Lumen 11/11/19 2200 20 G Right Upper Arm less than 1 day         Peripheral IV - Single Lumen 11/12/19 0000 20 G Right Hand less than 1 day              Significant Labs:    CBC/Anemia Profile:  Recent Labs   Lab 11/11/19 2242 11/12/19  0333   WBC 21.51* 25.06*   HGB 13.0* 12.4*   HCT 38.7* 38.1*   * 421*   MCV 86 87   RDW 14.5 14.3        Chemistries:  Recent Labs   Lab 11/12/19  0000 11/12/19  0333   * 134*   K 3.7 3.0*   CL 99 104   CO2 21* 21*   BUN 10 7   CREATININE 0.7 0.7   CALCIUM 7.6* 8.0*   ALBUMIN 1.7* 1.7*   PROT 6.0 6.1   BILITOT 0.9 0.6   ALKPHOS 140* 173*   ALT 68* 96*   AST 36 107*       Lactic Acid:   Recent Labs   Lab 11/11/19 2242 11/12/19  0247   LACTATE 3.2* 2.0       Significant Imaging: I have reviewed all pertinent imaging results/findings within the past 24 hours.    Assessment/Plan:     ID  * Septic shock  -source likely pneumonia. CXR with diffuse dense opacity of the left upper lobe  - Other etiologies include: TB, malignancy, infective endocarditis given his hx of IVDU   - Received vanc, zosyn, azithromycin in ED. Continue vanc, zosyn  - Receive 30cc/kg IVF in ED  - On levophed for pressure support; wean as tolerated  - F/U Quantiferon test  - F/u Blood cx x3, and respiratory panel  - consider echo to evaluate for vegetations  - UA negative for infection  - HIV negative  - Influenza negative        Critical Care Daily Checklist:    A: Awake: RASS Goal/Actual Goal: RASS Goal: 0-->alert and calm  Actual: Mcgowan Agitation Sedation Scale (RASS): Restless   B: Spontaneous Breathing Trial Performed?     C: SAT & SBT Coordinated?                        D: Delirium: CAM-ICU Overall CAM-ICU: Negative   E: Early Mobility Performed? Yes   F: Feeding Goal:    Status:     Current Diet Order   Procedures    Diet  Adult Regular (IDDSI Level 7)      AS: Analgesia/Sedation    T: Thromboembolic Prophylaxis Lovenox   H: HOB > 300 Yes   U: Stress Ulcer Prophylaxis (if needed)    G: Glucose Control    B: Bowel Function     I: Indwelling Catheter (Lines & Robledo) Necessity Y   D: De-escalation of Antimicrobials/Pharmacotherapies Vanc, zosyn    Plan for the day/ETD Wean pressors    Code Status:  Family/Goals of Care: Full Code         Critical secondary to Patient has a condition that poses threat to life and bodily function: Septic Shock     Critical care was time spent personally by me on the following activities: development of treatment plan with patient or surrogate and bedside caregivers, discussions with consultants, evaluation of patient's response to treatment, examination of patient, ordering and performing treatments and interventions, ordering and review of laboratory studies, ordering and review of radiographic studies, pulse oximetry, re-evaluation of patient's condition. This critical care time did not overlap with that of any other provider or involve time for any procedures.     Maricel Smyth MD  Critical Care Medicine  [facility]

## 2019-11-12 NOTE — HPI
44-year-old male with no medical history presents to the ED complaining fever and flu-like symptoms for the past week.  He reports fever at home with a T-max of 102.9° F, chills, cough productive of bloody sputum, shortness of breath, sore throat, dark-colored urine, unintentional weight loss, headache, lightheadedness, chest pain that is worse with coughing. No recent travels or known sick contacts.  He has not had a flu vaccine this year.  He denies nausea, vomiting, diarrhea, abdominal pain, dysuria.  He smokes daily, socially drinks alcohol, recreationally uses drugs, last IVDU 2 weeks ago.    In ED workup notable for WBC of 21.51, fever of 101.2.  Rapid HIV negative.  Troponin within normal limits. Influenza negative. Creatinine normal. Chest x-rayConfluent consolidation of much of the left upper lobe likely is pneumonia. Sepsis workup initiated with 30cc/kg IVF, blood cultures, and vancomycin, cefepime, azithromycin.  vaBlood culture x2 pending. Pt remained hypotensive after 30 cc/kilos bolus with BP 91/48. Levophed was started and MICU was consulted for septic shock.

## 2019-11-12 NOTE — PROGRESS NOTES
Pt admitted to SICU [id number] from ED per ED, RN. Pt placed on SICU monitors. Pt on room air. Pt on levo @ 0.05 mcg/kg/min drip upon admit. VSS. AAOx4. Skin intact with no breakdown. Critical care team notified of admit. Awaiting lab results. Will continue to monitor.

## 2019-11-12 NOTE — PROGRESS NOTES
Pharmacokinetic Initial Assessment: IV Vancomycin    Assessment/Plan:    Continue intravenous vancomycin with dose of 1000 mg every 12 hours  Desired empiric serum trough concentration is 15 to 20 mcg/mL  Draw vancomycin trough level 30 min prior to fourth dose on 1/13 at approximately 1130  Pharmacy will continue to follow and monitor vancomycin.      Please contact pharmacy at extension [phone] with any questions regarding this assessment.     Thank you for the consult,   Daniel Holland       Patient brief summary:  Hakeem Hunt is a 44 y.o. male initiated on antimicrobial therapy with IV Vancomycin for treatment of suspected bacteremia    Drug Allergies:   Review of patient's allergies indicates:   Allergen Reactions    Pcn [penicillins] Rash       Actual Body Weight:   59kg    Renal Function:   Estimated Creatinine Clearance: 114.7 mL/min (based on SCr of 0.7 mg/dL).,     Dialysis Method (if applicable):  N/A    CBC (last 72 hours):  Recent Labs   Lab Result Units 11/11/19  2242 11/12/19  0333   WBC K/uL 21.51* 25.06*   Hemoglobin g/dL 13.0* 12.4*   Hematocrit % 38.7* 38.1*   Platelets K/uL 423* 421*   Gran% % 85.1*  --    Lymph% % 6.9*  --    Mono% % 6.3  --    Eosinophil% % 0.1  --    Basophil% % 0.4  --    Differential Method  Automated  --        Metabolic Panel (last 72 hours):  Recent Labs   Lab Result Units 11/12/19  0000 11/12/19  0159   Sodium mmol/L 130*  --    Potassium mmol/L 3.7  --    Chloride mmol/L 99  --    CO2 mmol/L 21*  --    Glucose mg/dL 116*  --    Glucose, UA   --  Negative   BUN, Bld mg/dL 10  --    Creatinine mg/dL 0.7  --    Albumin g/dL 1.7*  --    Total Bilirubin mg/dL 0.9  --    Alkaline Phosphatase U/L 140*  --    AST U/L 36  --    ALT U/L 68*  --        Drug levels (last 3 results):  No results for input(s): VANCOMYCINRA, VANCOMYCINPE, VANCOMYCINTR in the last 72 hours.    Microbiologic Results:  Microbiology Results (last 7 days)     Procedure Component Value Units  Date/Time    Blood culture [[id number]]     Order Status:  No result Specimen:  Blood     AFB Culture & Smear [[id number]]     Order Status:  No result Specimen:  Respiratory from Sputum, Expectorated     Culture, Respiratory with Gram Stain [[id number]]     Order Status:  No result Specimen:  Respiratory from Sputum     Respiratory Viral Panel by PCR [facility]; Sputum [[id number]]     Order Status:  No result Specimen:  Respiratory     Blood Culture #2 **CANNOT BE ORDERED STAT** [[id number]] Collected:  11/11/19 2240    Order Status:  Sent Specimen:  Blood from Peripheral, Forearm, Right Updated:  11/11/19 2257    Blood Culture #1 **CANNOT BE ORDERED STAT** [[id number]] Collected:  11/11/19 2225    Order Status:  Sent Specimen:  Blood from Peripheral, Upper Arm, Right Updated:  11/11/19 2256

## 2019-11-12 NOTE — PROVIDER PROGRESS NOTES - EMERGENCY DEPT.
Encounter Date: 11/11/2019    ED Physician Progress Notes           Patient signed out to me by my colleague with instructions to follow-up pending work-up. Please see main ED note for previous ED stay documentation. Patient signed out to me with BP improvement and re-evaluation pending.    2:05 AM - Dani De Luna PA-C  BP not improving with fluids. SBP 80 after 2.75 L given in the ED. Will start levophed infusion. Discussed with ICU and they will come see the patient. They will admit the patient for ongoing management. Patient expresses understanding and agreeable to the plan. I have discussed the care of this patient with my supervising physician.    ED Medications:  Medications   norepinephrine 4 mg in dextrose 5% 250 mL infusion (premix) (titrating) (0.05 mcg/kg/min × 59 kg Intravenous New Bag 11/12/19 0204)   sodium chloride 0.9% flush 10 mL (has no administration in time range)   enoxaparin injection 40 mg (has no administration in time range)   ondansetron injection 4 mg (4 mg Intravenous Given 11/11/19 2257)   ketorolac injection 9.999 mg (9.999 mg Intravenous Given 11/11/19 2257)   acetaminophen tablet 1,000 mg (1,000 mg Oral Given 11/11/19 2259)   sodium chloride 0.9% bolus 1,770 mL (0 mL/kg × 59 kg Intravenous Stopped 11/12/19 0043)   ceFEPIme injection 2 g (2 g Intravenous Given 11/11/19 2328)   vancomycin in dextrose 5 % 1 gram/250 mL IVPB 1,000 mg (0 mg/kg × 59 kg Intravenous Stopped 11/12/19 0144)   azithromycin 500 mg in dextrose 5 % 250 mL IVPB (ready to mix system) (0 mg Intravenous Stopped 11/12/19 0145)   sodium chloride 0.9% bolus 1,000 mL (0 mLs Intravenous Stopped 11/12/19 0145)

## 2019-11-12 NOTE — PROVIDER TRANSFER
[facility] Medicine ICU Stepdown Acceptance Note    Date of Admission: 11/11/2019  Date of Transfer / Stepdown: 11/12/2019  Bosheri, C/J, L, Onc (IV chemo w/in 1 month), Gyn/Onc, or other special case?: no   ICU team stepping patient down: [facility]   ICU team member giving verbal handoff:  [id number]  Accepting  team: [facility]    Brief History of Present Illness:      Hakeem Hunt is a 44 y.o. male with no medical history presents to the ED complaining fever and flu-like symptoms for the past week.  + chills, cough productive of bloody sputum, shortness of breath, sore throat, dark-colored urine, unintentional weight loss, headache, lightheadedness, chest pain that is worse with coughing. No recent travels or known sick contacts no flu vax. He smokes daily, socially drinks alcohol, recreationally uses drugs, last IVDU 2 weeks ago.    Hospital/ICU Course:     Patient admitted for sepsis likely due to to pneumonia. TTE negative, no vegetations. BCx with NGTD. Quant gold, AFB culture/smear, resp panel, and  Legionella labs - all pending. Patient was weaned off levophed at 5AM on 11/12. Continued on broad spec antibiotics and transferred to Hospital Medicine for further care.     Consultants and Procedures:     Consultants:   Consults (From admission, onward)        Status Ordering Provider     Inpatient consult to Critical Care Medicine  Once     Provider:  (Not yet assigned)    Completed DEAAN SINGH     Pharmacy to dose Vancomycin consult  Once     Provider:  (Not yet assigned)    Acknowledged RACHEL CURRY        Procedures:  none    Transfer Information:     Code status: Full Code    Diet: Diet Adult Regular (IDDSI Level 7)    Physical Activity: as tolerated    To Do / Pending Studies / Follow ups:    Labs pending  Antibiotics    Patient has been accepted by [facility] Medicine Team IMENRIKE, who will assume care of the patient upon arrival to the floor from the ICU. Please contact ICU team with any concerns prior to  arrival. Please contact Hospital Medicine at [phone] or [phone] (please do NOT leave a voicemail) when patient arrives to the floor.    Lorrie Vick MD  Department of Hospital Medicine  Contact Information 7 AM - 7 PM  Spectralink # [phone]  Pager [phone]

## 2019-11-13 PROBLEM — D72.829 LEUKOCYTOSIS: Status: ACTIVE | Noted: 2019-11-12

## 2019-11-13 PROBLEM — R74.8 ELEVATED LIVER ENZYMES: Status: ACTIVE | Noted: 2019-11-13

## 2019-11-13 PROBLEM — J18.9 LEFT UPPER LOBE PNEUMONIA: Status: ACTIVE | Noted: 2019-11-13

## 2019-11-13 LAB
ALBUMIN SERPL BCP-MCNC: 1.5 G/DL (ref 3.5–5.2)
ALP SERPL-CCNC: 230 U/L (ref 55–135)
ALT SERPL W/O P-5'-P-CCNC: 144 U/L (ref 10–44)
ANION GAP SERPL CALC-SCNC: 9 MMOL/L (ref 8–16)
ANISOCYTOSIS BLD QL SMEAR: SLIGHT
AST SERPL-CCNC: 85 U/L (ref 10–40)
BASOPHILS # BLD AUTO: 0.08 K/UL (ref 0–0.2)
BASOPHILS NFR BLD: 0.3 % (ref 0–1.9)
BILIRUB SERPL-MCNC: 0.9 MG/DL (ref 0.1–1)
BUN SERPL-MCNC: 7 MG/DL (ref 6–20)
CALCIUM SERPL-MCNC: 8 MG/DL (ref 8.7–10.5)
CHLORIDE SERPL-SCNC: 102 MMOL/L (ref 95–110)
CO2 SERPL-SCNC: 23 MMOL/L (ref 23–29)
CREAT SERPL-MCNC: 0.6 MG/DL (ref 0.5–1.4)
DIFFERENTIAL METHOD: ABNORMAL
EOSINOPHIL # BLD AUTO: 0 K/UL (ref 0–0.5)
EOSINOPHIL NFR BLD: 0.1 % (ref 0–8)
ERYTHROCYTE [DISTWIDTH] IN BLOOD BY AUTOMATED COUNT: 14.6 % (ref 11.5–14.5)
EST. GFR  (AFRICAN AMERICAN): >60 ML/MIN/1.73 M^2
EST. GFR  (NON AFRICAN AMERICAN): >60 ML/MIN/1.73 M^2
GLUCOSE SERPL-MCNC: 98 MG/DL (ref 70–110)
HCT VFR BLD AUTO: 33.6 % (ref 40–54)
HGB BLD-MCNC: 11.1 G/DL (ref 14–18)
HYPOCHROMIA BLD QL SMEAR: ABNORMAL
IMM GRANULOCYTES # BLD AUTO: 0.44 K/UL (ref 0–0.04)
IMM GRANULOCYTES NFR BLD AUTO: 1.5 % (ref 0–0.5)
L PNEUMO AG UR QL IA: NOT DETECTED
LYMPHOCYTES # BLD AUTO: 1.8 K/UL (ref 1–4.8)
LYMPHOCYTES NFR BLD: 5.9 % (ref 18–48)
MAGNESIUM SERPL-MCNC: 1.7 MG/DL (ref 1.6–2.6)
MCH RBC QN AUTO: 28.6 PG (ref 27–31)
MCHC RBC AUTO-ENTMCNC: 33 G/DL (ref 32–36)
MCV RBC AUTO: 87 FL (ref 82–98)
MONOCYTES # BLD AUTO: 2.1 K/UL (ref 0.3–1)
MONOCYTES NFR BLD: 6.9 % (ref 4–15)
NEUTROPHILS # BLD AUTO: 25.7 K/UL (ref 1.8–7.7)
NEUTROPHILS NFR BLD: 85.3 % (ref 38–73)
NRBC BLD-RTO: 0 /100 WBC
OVALOCYTES BLD QL SMEAR: ABNORMAL
PHOSPHATE SERPL-MCNC: 2.9 MG/DL (ref 2.7–4.5)
PLATELET # BLD AUTO: 415 K/UL (ref 150–350)
PLATELET BLD QL SMEAR: ABNORMAL
PMV BLD AUTO: 9.6 FL (ref 9.2–12.9)
POIKILOCYTOSIS BLD QL SMEAR: SLIGHT
POLYCHROMASIA BLD QL SMEAR: ABNORMAL
POTASSIUM SERPL-SCNC: 3.8 MMOL/L (ref 3.5–5.1)
PROT SERPL-MCNC: 5.7 G/DL (ref 6–8.4)
RBC # BLD AUTO: 3.88 M/UL (ref 4.6–6.2)
SODIUM SERPL-SCNC: 134 MMOL/L (ref 136–145)
WBC # BLD AUTO: 30.12 K/UL (ref 3.9–12.7)

## 2019-11-13 PROCEDURE — 63600175 PHARM REV CODE 636 W HCPCS: Performed by: STUDENT IN AN ORGANIZED HEALTH CARE EDUCATION/TRAINING PROGRAM

## 2019-11-13 PROCEDURE — 87632 RESP VIRUS 6-11 TARGETS: CPT

## 2019-11-13 PROCEDURE — 36415 COLL VENOUS BLD VENIPUNCTURE: CPT

## 2019-11-13 PROCEDURE — 25000003 PHARM REV CODE 250: Performed by: INTERNAL MEDICINE

## 2019-11-13 PROCEDURE — 80053 COMPREHEN METABOLIC PANEL: CPT

## 2019-11-13 PROCEDURE — 86480 TB TEST CELL IMMUN MEASURE: CPT

## 2019-11-13 PROCEDURE — 99232 SBSQ HOSP IP/OBS MODERATE 35: CPT | Mod: ,,, | Performed by: INTERNAL MEDICINE

## 2019-11-13 PROCEDURE — C1751 CATH, INF, PER/CENT/MIDLINE: HCPCS

## 2019-11-13 PROCEDURE — 84100 ASSAY OF PHOSPHORUS: CPT

## 2019-11-13 PROCEDURE — 20600001 HC STEP DOWN PRIVATE ROOM

## 2019-11-13 PROCEDURE — 99232 PR SUBSEQUENT HOSPITAL CARE,LEVL II: ICD-10-PCS | Mod: ,,, | Performed by: INTERNAL MEDICINE

## 2019-11-13 PROCEDURE — 63600175 PHARM REV CODE 636 W HCPCS: Performed by: INTERNAL MEDICINE

## 2019-11-13 PROCEDURE — 76937 US GUIDE VASCULAR ACCESS: CPT

## 2019-11-13 PROCEDURE — 25000003 PHARM REV CODE 250: Performed by: STUDENT IN AN ORGANIZED HEALTH CARE EDUCATION/TRAINING PROGRAM

## 2019-11-13 PROCEDURE — 85025 COMPLETE CBC W/AUTO DIFF WBC: CPT

## 2019-11-13 PROCEDURE — 83735 ASSAY OF MAGNESIUM: CPT

## 2019-11-13 PROCEDURE — 36410 VNPNXR 3YR/> PHY/QHP DX/THER: CPT

## 2019-11-13 RX ORDER — OXYCODONE HYDROCHLORIDE 5 MG/1
5 TABLET ORAL EVERY 6 HOURS PRN
Status: DISCONTINUED | OUTPATIENT
Start: 2019-11-13 | End: 2019-11-18 | Stop reason: HOSPADM

## 2019-11-13 RX ORDER — ACETAMINOPHEN 500 MG
500 TABLET ORAL EVERY 6 HOURS PRN
Status: DISCONTINUED | OUTPATIENT
Start: 2019-11-13 | End: 2019-11-18 | Stop reason: HOSPADM

## 2019-11-13 RX ORDER — IPRATROPIUM BROMIDE AND ALBUTEROL SULFATE 2.5; .5 MG/3ML; MG/3ML
3 SOLUTION RESPIRATORY (INHALATION) EVERY 4 HOURS PRN
Status: DISCONTINUED | OUTPATIENT
Start: 2019-11-13 | End: 2019-11-14

## 2019-11-13 RX ADMIN — ONDANSETRON 8 MG: 8 TABLET, ORALLY DISINTEGRATING ORAL at 11:11

## 2019-11-13 RX ADMIN — OXYCODONE HYDROCHLORIDE 5 MG: 5 TABLET ORAL at 12:11

## 2019-11-13 RX ADMIN — ACETAMINOPHEN 650 MG: 325 TABLET ORAL at 11:11

## 2019-11-13 RX ADMIN — CEFEPIME 2 G: 2 INJECTION, POWDER, FOR SOLUTION INTRAVENOUS at 03:11

## 2019-11-13 RX ADMIN — OXYCODONE HYDROCHLORIDE 5 MG: 5 TABLET ORAL at 07:11

## 2019-11-13 RX ADMIN — ENOXAPARIN SODIUM 40 MG: 100 INJECTION SUBCUTANEOUS at 04:11

## 2019-11-13 RX ADMIN — KETOROLAC TROMETHAMINE 15 MG: 15 INJECTION, SOLUTION INTRAMUSCULAR; INTRAVENOUS at 03:11

## 2019-11-13 RX ADMIN — AZITHROMYCIN 500 MG: 250 TABLET, FILM COATED ORAL at 09:11

## 2019-11-13 RX ADMIN — VANCOMYCIN HYDROCHLORIDE 1000 MG: 1 INJECTION, POWDER, FOR SOLUTION INTRAVENOUS at 04:11

## 2019-11-13 RX ADMIN — ACETAMINOPHEN 500 MG: 500 TABLET ORAL at 07:11

## 2019-11-13 RX ADMIN — KETOROLAC TROMETHAMINE 15 MG: 15 INJECTION, SOLUTION INTRAMUSCULAR; INTRAVENOUS at 01:11

## 2019-11-13 NOTE — PROGRESS NOTES
"  [facility] Hospital Medicine  Progress Note    Patient Name: Hakeem Hunt  MRN: [id number]  Patient Class: IP- Inpatient   Admission Date: 11/11/2019  Length of Stay: 1 days  Attending Physician: Lorrie Vick MD  Primary Care Provider: Spencer Rivera MD    [facility] Medicine Team: [facility] HOSP MED D Lorrie Vick MD    Chief Complaint   Patient presents with    Chest Pain     Pt c/o productive cough, SOB, congestion, chest pain hurting worse with deep breathing/coughing and fevers since last week. Last ibuprofen at 1800 tonight. +smoker. Denies chest pain.      HPI: 44 y.o. male with no medical history presents to the ED complaining fever and flu-like symptoms for the past week.  + chills, cough productive of bloody sputum, shortness of breath, sore throat, dark-colored urine, unintentional weight loss, headache, lightheadedness, chest pain that is worse with coughing. No recent travels or known sick contacts no flu vax. He smokes daily, socially drinks alcohol, recreationally uses drugs, last IVDU 2 weeks ago.    Subjective:     Principal Problem:Septic shock    Interval History:  Patient uncooperative with interview and exam.  Complains of "pain all over" to Nursing.  Lost IV access.    Review of Systems   Constitutional: Negative for fever.   Respiratory: Negative for shortness of breath.      Objective:     Vital Signs (Most Recent):  Temp: 98.8 °F (37.1 °C) (11/13/19 1638)  Pulse: 98 (11/13/19 1638)  Resp: 18 (11/13/19 1638)  BP: 101/60 (11/13/19 1638)  SpO2: (!) 94 % (11/13/19 1638) Vital Signs (24h Range):  Temp:  [97.3 °F (36.3 °C)-99.5 °F (37.5 °C)] 98.8 °F (37.1 °C)  Pulse:  [] 98  Resp:  [17-19] 18  SpO2:  [89 %-96 %] 94 %  BP: ()/(53-60) 101/60     Weight: 60.1 kg (132 lb 7.9 oz)  Body mass index is 22.05 kg/m².    Intake/Output Summary (Last 24 hours) at 11/13/2019 1653  Last data filed at 11/13/2019 0000  Gross per 24 hour   Intake 240 ml   Output --   Net " 240 ml      Physical Exam   Constitutional: He is uncooperative. No distress.   HENT:   Mouth/Throat: Mucous membranes are not pale and not cyanotic.   Eyes: Conjunctivae and lids are normal.   Cardiovascular: S1 normal and S2 normal.   Pulmonary/Chest: Effort normal. He has decreased breath sounds.   Abdominal: Soft. Bowel sounds are normal.   Musculoskeletal: He exhibits no edema.   Neurological: He is alert.   Psychiatric: He is inattentive.     Significant Labs:     Blood Culture:   Recent Labs   Lab 11/11/19 2225 11/11/19 2240 11/12/19  0417   LABBLOO No Growth to date  No Growth to date No Growth to date  No Growth to date No Growth to date  No Growth to date     CBC:   Recent Labs   Lab 11/11/19 2242 11/12/19 0333 11/13/19 0435   WBC 21.51* 25.06* 30.12*   HGB 13.0* 12.4* 11.1*   HCT 38.7* 38.1* 33.6*   * 421* 415*     CMP:   Recent Labs   Lab 11/12/19  0000 11/12/19 0333 11/13/19  0435   * 134* 134*   K 3.7 3.0* 3.8   CL 99 104 102   CO2 21* 21* 23   * 119* 98   BUN 10 7 7   CREATININE 0.7 0.7 0.6   CALCIUM 7.6* 8.0* 8.0*   PROT 6.0 6.1 5.7*   ALBUMIN 1.7* 1.7* 1.5*   BILITOT 0.9 0.6 0.9   ALKPHOS 140* 173* 230*   AST 36 107* 85*   ALT 68* 96* 144*   ANIONGAP 10 9 9   EGFRNONAA >60.0 >60.0 >60.0     Lactic Acid:   Recent Labs   Lab 11/11/19 2242 11/12/19  0247   LACTATE 3.2* 2.0     Magnesium:   Recent Labs   Lab 11/13/19  0435   MG 1.7     Troponin:   Recent Labs   Lab 11/11/19 2242 11/12/19  0333   TROPONINI <0.006 <0.006     Urine Studies:   Recent Labs   Lab 11/12/19  0159   COLORU Yellow   APPEARANCEUA Clear   PHUR 6.0   SPECGRAV 1.010   PROTEINUA Negative   GLUCUA Negative   KETONESU Negative   BILIRUBINUA Negative   OCCULTUA Negative   NITRITE Negative   LEUKOCYTESUR Negative       Significant Imaging: CXR: I have reviewed all pertinent results/findings within the past 24 hours and my personal findings are:  Left upper lobe pneumonia    Assessment/Plan:      Active  Diagnoses:    Diagnosis Date Noted POA    PRINCIPAL PROBLEM:  Septic shock [A41.9, R65.21] 11/12/2019 Yes    Left upper lobe pneumonia [J18.1] 11/13/2019 Yes    Elevated liver enzymes [R74.8] 11/13/2019 Yes    Leukocytosis [D72.829] 11/12/2019 Yes      Problems Resolved During this Admission:     Overview / Hospital Course:   Patient admitted for sepsis likely due to to pneumonia. TTE negative, no vegetations. BCx with NGTD. Quant gold, AFB culture/smear, resp panel, and  Legionella labs - all pending. Patient was weaned off levophed at 5AM on 11/12. Continued on broad spectrum antibiotics and transferred to Hospital Medicine for further care on 11/13.     Inpatient Medications prescribed for management of Current Problems:   Scheduled Meds:    azithromycin  500 mg Oral QHS    ceFEPime (MAXIPIME) IVPB  2 g Intravenous Q12H    enoxaparin  40 mg Subcutaneous Daily    vancomycin (VANCOCIN) IVPB  1,000 mg Intravenous Q12H     Continuous Infusions:   As Needed: acetaminophen, albuterol-ipratropium, ketorolac, ondansetron, ondansetron, oxyCODONE, ramelteon, sodium chloride 0.9%, sodium chloride 0.9%    Assessment and Plan by Problem     Septic shock  Left upper lobe pneumonia  Shock ikely due to pneumonia. CXR with diffuse dense opacity of the left upper lobe  - Other possible etiologies include: TB, malignancy, infective endocarditis given his hx of IVDU   - Received vancomycin, Zosyn, azithromycin in ED which were continued  - Received 30cc/kg IVF in ED  - Required levophed for pressure support; weaned as tolerated.  - Antibiotics changed to cefepime, vancomycin, and azithromycin started. Recent weight loss, night sweats, fever for the last week.  He is homeless and in and out of long-term. Rule out TB.  - F/U Quantiferon test and collect AFB cultures  - F/u Blood cx x3, and respiratory panel  - TT Echo without vegetations  - UA negative for infection  - HIV negative  - Influenza negative     Leukocytosis        Not improving     Elevated liver enzymes       Acute hepatitis panel ordered    Diet Adult Regular (IDDSI Level 7)    Significant LDAs:     HIGH RISK CONDITION(S):   Patient is currently on drug therapy requiring intensive monitoring for toxicity: Vancomycin     Discharge plan and follow up  Home or Self Care    VTE Risk Mitigation (From admission, onward)         Ordered     enoxaparin injection 40 mg  Daily      11/12/19 0302     IP VTE HIGH RISK PATIENT  Once      11/12/19 0302                Lorrie Vick MD  Department of Hospital Medicine   [facility]

## 2019-11-13 NOTE — PLAN OF CARE
CM met with patient and girlfriend at the bedside to discuss D/C POC needs. Patient AAO x's 3 and able to verify demographics in the chart are correct. CM name and contact number listed on the patient's white board.  CM provided explanation of discharge plan process. CM left blue folder at the bedside with explanation of qualification for placement and facility resources. Patient/family expressed understanding. CM remains available for any further patient needs or concerns.     Spencer Rivera MD  [address] [facility] [address] / [place] [address]      [facility] [address] [place] [address]  Phone: [phone] Fax: [phone]      Extended Emergency Contact Information  Primary Emergency Contact: Veena Hunt  Address: [address] [place] [address]  Home Phone: [phone]  Relation: Mother       11/12/19 1501   Discharge Assessment   Assessment Type Discharge Planning Assessment   Confirmed/corrected address and phone number on facesheet? Yes   Assessment information obtained from? Patient   Prior to hospitilization cognitive status: Alert/Oriented   Prior to hospitalization functional status: Independent   Current cognitive status: Alert/Oriented   Current Functional Status: Independent   Lives With alone   Able to Return to Prior Arrangements other (see comments)  (TBD)   Is patient able to care for self after discharge? Unable to determine at this time (comments)   Who are your caregiver(s) and their phone number(s)? Veena Hunt , mother [phone]   Patient currently being followed by outpatient case management? No   Equipment Currently Used at Home none   Do you have any problems affording any of your prescribed medications? Yes   Does the patient have transportation home? Yes   Transportation Anticipated family or friend will provide   Does the patient receive services at the Coumadin  Clinic? No   Discharge Plan A Home   Discharge Plan B Home with family   DME Needed Upon Discharge  none   Patient/Family in Agreement with Plan yes       Piero Boss RN MSN  Critical Care-   Ext. [phone]

## 2019-11-13 NOTE — PLAN OF CARE
Plan of care reviewed with patient. PRN pain medication given as ordered. Safety maintained, call light in reach, bed in lowest position, will continue to monitor.

## 2019-11-13 NOTE — PLAN OF CARE
11/13/19 0955   Discharge Reassessment   Assessment Type Discharge Planning Reassessment   Do you have any problems affording any of your prescribed medications? Yes   Discharge Plan A Shelter   Discharge Plan B Other  (streets)   DME Needed Upon Discharge  none   Anticipated Discharge Disposition Home   Can the patient answer the patient profile reliably? Yes, cognitively intact   How does the patient rate their overall health at the present time? Fair   Number of comorbid conditions (as recorded on the chart) One   Post-Acute Status   Post-Acute Authorization Other   Other Status No Post-Acute Service Needs     Patient stepdown from SICU. Patient was admitted with septic shock. Airborne isolation in process while r/o TB. Patient resting quietly in bed with friend, Marily Pastrana ([phone]), at the bedside when CM rounded. Patient is homeless & does not know where he will go following discharge. Address listed of patient's demographic sheet belongs to the patient's mother. Patient stated that he cannot discharge to his mother's house. CM offered transportation to a local shelter. Will continue to follow.

## 2019-11-13 NOTE — CONSULTS
Single lumen 18g x 8 midline placed to rt brachial vein.  Max dwell date 12/12/19, Lot# [id number].  Needle advance into the vessel under real time ultrasound guidance.  Image recorded and saved.

## 2019-11-14 PROBLEM — R76.8 HEPATITIS C ANTIBODY POSITIVE IN BLOOD: Status: ACTIVE | Noted: 2019-11-14

## 2019-11-14 PROBLEM — Z72.0 TOBACCO ABUSE: Status: ACTIVE | Noted: 2019-11-14

## 2019-11-14 PROBLEM — R09.1 PLEURISY: Status: ACTIVE | Noted: 2019-11-14

## 2019-11-14 LAB
ALBUMIN SERPL BCP-MCNC: 1.5 G/DL (ref 3.5–5.2)
ALP SERPL-CCNC: 194 U/L (ref 55–135)
ALT SERPL W/O P-5'-P-CCNC: 122 U/L (ref 10–44)
ANION GAP SERPL CALC-SCNC: 7 MMOL/L (ref 8–16)
ANION GAP SERPL CALC-SCNC: 9 MMOL/L (ref 8–16)
AST SERPL-CCNC: 79 U/L (ref 10–40)
BASOPHILS # BLD AUTO: 0.05 K/UL (ref 0–0.2)
BASOPHILS # BLD AUTO: 0.06 K/UL (ref 0–0.2)
BASOPHILS NFR BLD: 0.3 % (ref 0–1.9)
BASOPHILS NFR BLD: 0.3 % (ref 0–1.9)
BILIRUB SERPL-MCNC: 0.5 MG/DL (ref 0.1–1)
BUN SERPL-MCNC: 7 MG/DL (ref 6–20)
BUN SERPL-MCNC: 8 MG/DL (ref 6–20)
CALCIUM SERPL-MCNC: 7.8 MG/DL (ref 8.7–10.5)
CALCIUM SERPL-MCNC: 7.9 MG/DL (ref 8.7–10.5)
CHLORIDE SERPL-SCNC: 100 MMOL/L (ref 95–110)
CHLORIDE SERPL-SCNC: 98 MMOL/L (ref 95–110)
CO2 SERPL-SCNC: 23 MMOL/L (ref 23–29)
CO2 SERPL-SCNC: 25 MMOL/L (ref 23–29)
CREAT SERPL-MCNC: 0.6 MG/DL (ref 0.5–1.4)
CREAT SERPL-MCNC: 0.7 MG/DL (ref 0.5–1.4)
DIFFERENTIAL METHOD: ABNORMAL
DIFFERENTIAL METHOD: ABNORMAL
EOSINOPHIL # BLD AUTO: 0 K/UL (ref 0–0.5)
EOSINOPHIL # BLD AUTO: 0.1 K/UL (ref 0–0.5)
EOSINOPHIL NFR BLD: 0.2 % (ref 0–8)
EOSINOPHIL NFR BLD: 0.4 % (ref 0–8)
ERYTHROCYTE [DISTWIDTH] IN BLOOD BY AUTOMATED COUNT: 14.6 % (ref 11.5–14.5)
ERYTHROCYTE [DISTWIDTH] IN BLOOD BY AUTOMATED COUNT: 14.6 % (ref 11.5–14.5)
EST. GFR  (AFRICAN AMERICAN): >60 ML/MIN/1.73 M^2
EST. GFR  (AFRICAN AMERICAN): >60 ML/MIN/1.73 M^2
EST. GFR  (NON AFRICAN AMERICAN): >60 ML/MIN/1.73 M^2
EST. GFR  (NON AFRICAN AMERICAN): >60 ML/MIN/1.73 M^2
GLUCOSE SERPL-MCNC: 144 MG/DL (ref 70–110)
GLUCOSE SERPL-MCNC: 96 MG/DL (ref 70–110)
HAV IGM SERPL QL IA: NEGATIVE
HBV CORE IGM SERPL QL IA: NEGATIVE
HBV SURFACE AG SERPL QL IA: NEGATIVE
HCT VFR BLD AUTO: 29.9 % (ref 40–54)
HCT VFR BLD AUTO: 30.8 % (ref 40–54)
HCV AB SERPL QL IA: POSITIVE
HCV AB SERPL QL IA: POSITIVE
HGB BLD-MCNC: 10 G/DL (ref 14–18)
HGB BLD-MCNC: 10.1 G/DL (ref 14–18)
IMM GRANULOCYTES # BLD AUTO: 0.15 K/UL (ref 0–0.04)
IMM GRANULOCYTES # BLD AUTO: 0.23 K/UL (ref 0–0.04)
IMM GRANULOCYTES NFR BLD AUTO: 0.9 % (ref 0–0.5)
IMM GRANULOCYTES NFR BLD AUTO: 1.2 % (ref 0–0.5)
LACTATE SERPL-SCNC: 1.8 MMOL/L (ref 0.5–2.2)
LYMPHOCYTES # BLD AUTO: 1.7 K/UL (ref 1–4.8)
LYMPHOCYTES # BLD AUTO: 2.1 K/UL (ref 1–4.8)
LYMPHOCYTES NFR BLD: 13.1 % (ref 18–48)
LYMPHOCYTES NFR BLD: 8.8 % (ref 18–48)
MAGNESIUM SERPL-MCNC: 1.7 MG/DL (ref 1.6–2.6)
MAGNESIUM SERPL-MCNC: 1.8 MG/DL (ref 1.6–2.6)
MCH RBC QN AUTO: 28.5 PG (ref 27–31)
MCH RBC QN AUTO: 29.2 PG (ref 27–31)
MCHC RBC AUTO-ENTMCNC: 32.8 G/DL (ref 32–36)
MCHC RBC AUTO-ENTMCNC: 33.4 G/DL (ref 32–36)
MCV RBC AUTO: 87 FL (ref 82–98)
MCV RBC AUTO: 87 FL (ref 82–98)
MONOCYTES # BLD AUTO: 1.3 K/UL (ref 0.3–1)
MONOCYTES # BLD AUTO: 1.6 K/UL (ref 0.3–1)
MONOCYTES NFR BLD: 10 % (ref 4–15)
MONOCYTES NFR BLD: 6.5 % (ref 4–15)
NEUTROPHILS # BLD AUTO: 12.1 K/UL (ref 1.8–7.7)
NEUTROPHILS # BLD AUTO: 16.4 K/UL (ref 1.8–7.7)
NEUTROPHILS NFR BLD: 75.3 % (ref 38–73)
NEUTROPHILS NFR BLD: 83 % (ref 38–73)
NRBC BLD-RTO: 0 /100 WBC
NRBC BLD-RTO: 0 /100 WBC
PHOSPHATE SERPL-MCNC: 2.4 MG/DL (ref 2.7–4.5)
PHOSPHATE SERPL-MCNC: 3.1 MG/DL (ref 2.7–4.5)
PLATELET # BLD AUTO: 466 K/UL (ref 150–350)
PLATELET # BLD AUTO: 501 K/UL (ref 150–350)
PMV BLD AUTO: 9.4 FL (ref 9.2–12.9)
PMV BLD AUTO: 9.6 FL (ref 9.2–12.9)
POTASSIUM SERPL-SCNC: 3.1 MMOL/L (ref 3.5–5.1)
POTASSIUM SERPL-SCNC: 3.6 MMOL/L (ref 3.5–5.1)
PROT SERPL-MCNC: 5.8 G/DL (ref 6–8.4)
RBC # BLD AUTO: 3.43 M/UL (ref 4.6–6.2)
RBC # BLD AUTO: 3.55 M/UL (ref 4.6–6.2)
SODIUM SERPL-SCNC: 130 MMOL/L (ref 136–145)
SODIUM SERPL-SCNC: 132 MMOL/L (ref 136–145)
VANCOMYCIN TROUGH SERPL-MCNC: 2.5 UG/ML (ref 10–22)
WBC # BLD AUTO: 16.12 K/UL (ref 3.9–12.7)
WBC # BLD AUTO: 19.68 K/UL (ref 3.9–12.7)

## 2019-11-14 PROCEDURE — 84145 PROCALCITONIN (PCT): CPT

## 2019-11-14 PROCEDURE — 80053 COMPREHEN METABOLIC PANEL: CPT

## 2019-11-14 PROCEDURE — 93005 ELECTROCARDIOGRAM TRACING: CPT

## 2019-11-14 PROCEDURE — 87040 BLOOD CULTURE FOR BACTERIA: CPT

## 2019-11-14 PROCEDURE — 25000242 PHARM REV CODE 250 ALT 637 W/ HCPCS: Performed by: INTERNAL MEDICINE

## 2019-11-14 PROCEDURE — 80048 BASIC METABOLIC PNL TOTAL CA: CPT

## 2019-11-14 PROCEDURE — 99232 SBSQ HOSP IP/OBS MODERATE 35: CPT | Mod: ,,, | Performed by: INTERNAL MEDICINE

## 2019-11-14 PROCEDURE — 87116 MYCOBACTERIA CULTURE: CPT

## 2019-11-14 PROCEDURE — 63600175 PHARM REV CODE 636 W HCPCS: Performed by: INTERNAL MEDICINE

## 2019-11-14 PROCEDURE — 20600001 HC STEP DOWN PRIVATE ROOM

## 2019-11-14 PROCEDURE — 25000003 PHARM REV CODE 250: Performed by: INTERNAL MEDICINE

## 2019-11-14 PROCEDURE — 83735 ASSAY OF MAGNESIUM: CPT | Mod: 91

## 2019-11-14 PROCEDURE — 87015 SPECIMEN INFECT AGNT CONCNTJ: CPT

## 2019-11-14 PROCEDURE — 94664 DEMO&/EVAL PT USE INHALER: CPT

## 2019-11-14 PROCEDURE — 94761 N-INVAS EAR/PLS OXIMETRY MLT: CPT

## 2019-11-14 PROCEDURE — 99232 PR SUBSEQUENT HOSPITAL CARE,LEVL II: ICD-10-PCS | Mod: ,,, | Performed by: INTERNAL MEDICINE

## 2019-11-14 PROCEDURE — 27000646 HC AEROBIKA DEVICE

## 2019-11-14 PROCEDURE — 36415 COLL VENOUS BLD VENIPUNCTURE: CPT

## 2019-11-14 PROCEDURE — 87206 SMEAR FLUORESCENT/ACID STAI: CPT

## 2019-11-14 PROCEDURE — 84100 ASSAY OF PHOSPHORUS: CPT

## 2019-11-14 PROCEDURE — 63600175 PHARM REV CODE 636 W HCPCS: Performed by: STUDENT IN AN ORGANIZED HEALTH CARE EDUCATION/TRAINING PROGRAM

## 2019-11-14 PROCEDURE — 80074 ACUTE HEPATITIS PANEL: CPT

## 2019-11-14 PROCEDURE — 99900035 HC TECH TIME PER 15 MIN (STAT)

## 2019-11-14 PROCEDURE — 63600175 PHARM REV CODE 636 W HCPCS: Performed by: PHYSICIAN ASSISTANT

## 2019-11-14 PROCEDURE — 80202 ASSAY OF VANCOMYCIN: CPT

## 2019-11-14 PROCEDURE — 94640 AIRWAY INHALATION TREATMENT: CPT

## 2019-11-14 PROCEDURE — 83605 ASSAY OF LACTIC ACID: CPT

## 2019-11-14 PROCEDURE — 93010 ELECTROCARDIOGRAM REPORT: CPT | Mod: ,,, | Performed by: INTERNAL MEDICINE

## 2019-11-14 PROCEDURE — 85025 COMPLETE CBC W/AUTO DIFF WBC: CPT | Mod: 91

## 2019-11-14 PROCEDURE — 83735 ASSAY OF MAGNESIUM: CPT

## 2019-11-14 PROCEDURE — 25000003 PHARM REV CODE 250: Performed by: PHYSICIAN ASSISTANT

## 2019-11-14 PROCEDURE — 84100 ASSAY OF PHOSPHORUS: CPT | Mod: 91

## 2019-11-14 PROCEDURE — 93010 EKG 12-LEAD: ICD-10-PCS | Mod: ,,, | Performed by: INTERNAL MEDICINE

## 2019-11-14 RX ORDER — POTASSIUM CHLORIDE 20 MEQ/1
40 TABLET, EXTENDED RELEASE ORAL 3 TIMES DAILY
Status: COMPLETED | OUTPATIENT
Start: 2019-11-14 | End: 2019-11-14

## 2019-11-14 RX ORDER — LANOLIN ALCOHOL/MO/W.PET/CERES
400 CREAM (GRAM) TOPICAL DAILY
Status: DISCONTINUED | OUTPATIENT
Start: 2019-11-14 | End: 2019-11-18 | Stop reason: HOSPADM

## 2019-11-14 RX ORDER — BENZONATATE 100 MG/1
100 CAPSULE ORAL 3 TIMES DAILY PRN
Status: DISCONTINUED | OUTPATIENT
Start: 2019-11-14 | End: 2019-11-18 | Stop reason: HOSPADM

## 2019-11-14 RX ORDER — VANCOMYCIN HCL IN 5 % DEXTROSE 1G/250ML
1000 PLASTIC BAG, INJECTION (ML) INTRAVENOUS
Status: DISCONTINUED | OUTPATIENT
Start: 2019-11-15 | End: 2019-11-16

## 2019-11-14 RX ORDER — CEFEPIME HYDROCHLORIDE 2 G/1
2 INJECTION, POWDER, FOR SOLUTION INTRAVENOUS
Status: COMPLETED | OUTPATIENT
Start: 2019-11-14 | End: 2019-11-15

## 2019-11-14 RX ORDER — IPRATROPIUM BROMIDE AND ALBUTEROL SULFATE 2.5; .5 MG/3ML; MG/3ML
3 SOLUTION RESPIRATORY (INHALATION) EVERY 4 HOURS
Status: DISCONTINUED | OUTPATIENT
Start: 2019-11-14 | End: 2019-11-18 | Stop reason: HOSPADM

## 2019-11-14 RX ADMIN — OXYCODONE HYDROCHLORIDE 5 MG: 5 TABLET ORAL at 04:11

## 2019-11-14 RX ADMIN — ENOXAPARIN SODIUM 40 MG: 100 INJECTION SUBCUTANEOUS at 04:11

## 2019-11-14 RX ADMIN — VANCOMYCIN HYDROCHLORIDE 500 MG: 500 INJECTION, POWDER, LYOPHILIZED, FOR SOLUTION INTRAVENOUS at 08:11

## 2019-11-14 RX ADMIN — GUAIFENESIN AND DEXTROMETHORPHAN HYDROBROMIDE 1 TABLET: 600; 30 TABLET, EXTENDED RELEASE ORAL at 09:11

## 2019-11-14 RX ADMIN — GUAIFENESIN AND DEXTROMETHORPHAN HYDROBROMIDE 2 TABLET: 600; 30 TABLET, EXTENDED RELEASE ORAL at 08:11

## 2019-11-14 RX ADMIN — KETOROLAC TROMETHAMINE 15 MG: 15 INJECTION, SOLUTION INTRAMUSCULAR; INTRAVENOUS at 04:11

## 2019-11-14 RX ADMIN — VANCOMYCIN HYDROCHLORIDE 1000 MG: 1 INJECTION, POWDER, FOR SOLUTION INTRAVENOUS at 04:11

## 2019-11-14 RX ADMIN — OXYCODONE HYDROCHLORIDE 5 MG: 5 TABLET ORAL at 01:11

## 2019-11-14 RX ADMIN — ACETAMINOPHEN 500 MG: 500 TABLET ORAL at 04:11

## 2019-11-14 RX ADMIN — POTASSIUM CHLORIDE 40 MEQ: 1500 TABLET, EXTENDED RELEASE ORAL at 03:11

## 2019-11-14 RX ADMIN — CEFEPIME 2 G: 2 INJECTION, POWDER, FOR SOLUTION INTRAVENOUS at 03:11

## 2019-11-14 RX ADMIN — ACETAMINOPHEN 500 MG: 500 TABLET ORAL at 01:11

## 2019-11-14 RX ADMIN — VANCOMYCIN HYDROCHLORIDE 1000 MG: 1 INJECTION, POWDER, FOR SOLUTION INTRAVENOUS at 06:11

## 2019-11-14 RX ADMIN — MAGNESIUM OXIDE TAB 400 MG (241.3 MG ELEMENTAL MG) 400 MG: 400 (241.3 MG) TAB at 09:11

## 2019-11-14 RX ADMIN — CEFEPIME 2 G: 2 INJECTION, POWDER, FOR SOLUTION INTRAVENOUS at 11:11

## 2019-11-14 RX ADMIN — SODIUM CHLORIDE 500 ML: 0.9 INJECTION, SOLUTION INTRAVENOUS at 11:11

## 2019-11-14 RX ADMIN — POTASSIUM CHLORIDE 40 MEQ: 1500 TABLET, EXTENDED RELEASE ORAL at 09:11

## 2019-11-14 RX ADMIN — KETOROLAC TROMETHAMINE 15 MG: 15 INJECTION, SOLUTION INTRAMUSCULAR; INTRAVENOUS at 12:11

## 2019-11-14 RX ADMIN — OXYCODONE HYDROCHLORIDE 5 MG: 5 TABLET ORAL at 09:11

## 2019-11-14 RX ADMIN — IPRATROPIUM BROMIDE AND ALBUTEROL SULFATE 3 ML: .5; 3 SOLUTION RESPIRATORY (INHALATION) at 02:11

## 2019-11-14 RX ADMIN — ACETAMINOPHEN 500 MG: 500 TABLET ORAL at 07:11

## 2019-11-14 RX ADMIN — GUAIFENESIN AND DEXTROMETHORPHAN HYDROBROMIDE 1 TABLET: 600; 30 TABLET, EXTENDED RELEASE ORAL at 01:11

## 2019-11-14 RX ADMIN — BENZONATATE 100 MG: 100 CAPSULE ORAL at 03:11

## 2019-11-14 RX ADMIN — IPRATROPIUM BROMIDE AND ALBUTEROL SULFATE 3 ML: .5; 3 SOLUTION RESPIRATORY (INHALATION) at 07:11

## 2019-11-14 NOTE — PLAN OF CARE
Problem: Adult Inpatient Plan of Care  Goal: Plan of Care Review  Description  Dx: Septic shock    No PMHx    11/11: ED after 1 week of SOB, flu like symptoms, Bloody sputum. Levo Started. SICU -- levo off    POC reviewed with pt. Questions invited and answered. VS stable, A&Ox4. Pt verbalized understanding however, he still has more questions for the doctor. Will continue to monitor.       Outcome: Ongoing, Not Progressing  Flowsheets (Taken 11/13/2019 1821)  Plan of Care Reviewed With: patient      Goal: Patient-Specific Goal (Individualization)  Outcome: Ongoing, Not Progressing   POC reviewed with pt. Pt wishes to be informed about care. Prior to today pt states he was not informed about any of his care and seemed to not know anything about his care.   Problem: Fall Injury Risk  Goal: Absence of Fall and Fall-Related Injury  Outcome: Ongoing, Not Progressing   POC reviewed with pt. Pt verbalized understanding.

## 2019-11-14 NOTE — PLAN OF CARE
Plan of care reviewed with patient. Patient temp elevated overnight. PRN tylenol was given as ordered. Safety maintained, call light in reach, bed in lowest position, will continue to monitor.

## 2019-11-14 NOTE — PROGRESS NOTES
[facility] Hospital Medicine  Progress Note    Patient Name: Hakeem Hunt  MRN: [id number]  Patient Class: IP- Inpatient   Admission Date: 11/11/2019  Length of Stay: 2 days  Attending Physician: Lorrie Vick MD  Primary Care Provider: Spencer Rivera MD    [facility] Medicine Team: [facility] Lorrie Vick MD    Chief Complaint   Patient presents with    Chest Pain     Pt c/o productive cough, SOB, congestion, chest pain hurting worse with deep breathing/coughing and fevers since last week. Last ibuprofen at 1800 tonight. +smoker. Denies chest pain.      HPI: 44 y.o. male with no medical history presents to the ED complaining fever and flu-like symptoms for the past week.  + chills, cough productive of bloody sputum, shortness of breath, sore throat, dark-colored urine, unintentional weight loss, headache, lightheadedness, chest pain that is worse with coughing. No recent travels or known sick contacts no flu vax. He smokes daily, socially drinks alcohol, recreationally uses drugs, last IVDU 2 weeks ago.    Subjective:     Principal Problem:Septic shock    Interval History:  Patient more interactive today. Having pleuritic chest pain with cough.    Review of Systems   Constitutional: Negative for fever.   Respiratory: Positive for cough and chest tightness. Negative for shortness of breath.      Objective:     Vital Signs (Most Recent):  Temp: 98.1 °F (36.7 °C) (11/14/19 1115)  Pulse: 96 (11/14/19 1200)  Resp: 17 (11/14/19 1115)  BP: (!) 102/59 (11/14/19 1115)  SpO2: (!) 94 % (11/14/19 1137) Vital Signs (24h Range):  Temp:  [98.1 °F (36.7 °C)-100.2 °F (37.9 °C)] 98.1 °F (36.7 °C)  Pulse:  [] 96  Resp:  [16-19] 17  SpO2:  [90 %-96 %] 94 %  BP: ()/(52-60) 102/59     Weight: 62 kg (136 lb 11 oz)  Body mass index is 22.75 kg/m².    Intake/Output Summary (Last 24 hours) at 11/14/2019 1337  Last data filed at 11/14/2019 0406  Gross per 24 hour   Intake 500 ml   Output  1050 ml   Net -550 ml      Physical Exam   Constitutional: No distress.   Eyes: Conjunctivae and lids are normal.   Cardiovascular: S1 normal and S2 normal.   Pulmonary/Chest: Effort normal. He has decreased breath sounds.   Abdominal: Soft. Bowel sounds are normal.   Musculoskeletal: He exhibits no edema.   Neurological: He is alert. He is not disoriented.     Significant Labs:   CBC:   Recent Labs   Lab 11/13/19  0435 11/14/19  0506   WBC 30.12* 19.68*   HGB 11.1* 10.1*   HCT 33.6* 30.8*   * 466*     CMP:   Recent Labs   Lab 11/13/19  0435 11/14/19  0506   * 130*   K 3.8 3.1*    98   CO2 23 23   GLU 98 144*   BUN 7 8   CREATININE 0.6 0.7   CALCIUM 8.0* 7.9*   PROT 5.7* 5.8*   ALBUMIN 1.5* 1.5*   BILITOT 0.9 0.5   ALKPHOS 230* 194*   AST 85* 79*   * 122*   ANIONGAP 9 9   EGFRNONAA >60.0 >60.0     Magnesium:   Recent Labs   Lab 11/13/19 0435 11/14/19  0506   MG 1.7 1.7       Significant Imaging: CXR: I have reviewed all pertinent results/findings within the past 24 hours and my personal findings are:  Left upper lobe pneumonia    Assessment/Plan:      Active Diagnoses:    Diagnosis Date Noted POA    PRINCIPAL PROBLEM:  Septic shock [A41.9, R65.21] 11/12/2019 Yes    Left upper lobe pneumonia [J18.1] 11/13/2019 Yes    Elevated liver enzymes [R74.8] 11/13/2019 Yes    Leukocytosis [D72.829] 11/12/2019 Yes      Problems Resolved During this Admission:     Overview / Hospital Course:   Patient admitted for sepsis likely due to to pneumonia. TTE negative, no vegetations. BCx with NGTD. Quant gold, AFB culture/smear, resp panel, and  Legionella labs - all pending. Patient was weaned off levophed at 5AM on 11/12. Continued on broad spectrum antibiotics and transferred to Hospital Medicine for further care on 11/13.     Inpatient Medications prescribed for management of Current Problems:   Scheduled Meds:    albuterol-ipratropium  3 mL Nebulization Q4H    ceFEPime (MAXIPIME) IVPB  2 g  Intravenous Q12H    dextromethorphan-guaifenesin  mg  2 tablet Oral BID    enoxaparin  40 mg Subcutaneous Daily    magnesium oxide  400 mg Oral Daily    vancomycin (VANCOCIN) IVPB  500 mg Intravenous Once    [START ON 11/15/2019] vancomycin (VANCOCIN) IVPB  1,000 mg Intravenous Q8H     Continuous Infusions:   As Needed: acetaminophen, albuterol-ipratropium, ketorolac, ondansetron, ondansetron, oxyCODONE, ramelteon, sodium chloride 0.9%, sodium chloride 0.9%    Assessment and Plan by Problem     Septic shock  Left upper lobe pneumonia  Shock ikely due to pneumonia. CXR with diffuse dense opacity of the left upper lobe  - Other possible etiologies include: TB, malignancy, infective endocarditis given his hx of IVDU   - Received vancomycin, Zosyn, azithromycin in ED which were continued  - Received 30cc/kg IVF in ED  - Required levophed for pressure support; weaned as tolerated.  - Antibiotics changed to cefepime, vancomycin, and azithromycin started. Recent weight loss, night sweats, fever for the last week.  He is homeless and in and out of longterm. Rule out TB.  - F/U Quantiferon test and collect AFB cultures  - F/u Blood cx x3, and respiratory panel  - TT Echo without vegetations  - UA negative for infection  - HIV negative  - Influenza negative  - Awaiting final results of AFB smears x 3; cultures pending. Plan to de-escalate cephalosporin 11/15.     Leukocytosis       Improving     Elevated liver enzymes       Acute hepatitis panel reveals positive Hep C Ab.       Refer to Hep C Clinic    Diet Adult Regular (IDDSI Level 7)    Significant LDAs:     HIGH RISK CONDITION(S):   Patient is currently on drug therapy requiring intensive monitoring for toxicity: Vancomycin     Discharge plan and follow up  Home or Self Care    VTE Risk Mitigation (From admission, onward)         Ordered     enoxaparin injection 40 mg  Daily      11/12/19 0302     IP VTE HIGH RISK PATIENT  Once      11/12/19 0302                 Lorrie Vick MD  Department of Hospital Medicine   [facility]

## 2019-11-15 LAB
ALBUMIN SERPL BCP-MCNC: 1.5 G/DL (ref 3.5–5.2)
ALP SERPL-CCNC: 175 U/L (ref 55–135)
ALT SERPL W/O P-5'-P-CCNC: 102 U/L (ref 10–44)
ANION GAP SERPL CALC-SCNC: 6 MMOL/L (ref 8–16)
AST SERPL-CCNC: 49 U/L (ref 10–40)
BASOPHILS # BLD AUTO: 0.06 K/UL (ref 0–0.2)
BASOPHILS NFR BLD: 0.3 % (ref 0–1.9)
BILIRUB SERPL-MCNC: 0.5 MG/DL (ref 0.1–1)
BUN SERPL-MCNC: 5 MG/DL (ref 6–20)
CALCIUM SERPL-MCNC: 7.9 MG/DL (ref 8.7–10.5)
CHLORIDE SERPL-SCNC: 98 MMOL/L (ref 95–110)
CO2 SERPL-SCNC: 25 MMOL/L (ref 23–29)
CREAT SERPL-MCNC: 0.6 MG/DL (ref 0.5–1.4)
DIFFERENTIAL METHOD: ABNORMAL
EOSINOPHIL # BLD AUTO: 0 K/UL (ref 0–0.5)
EOSINOPHIL NFR BLD: 0.2 % (ref 0–8)
ERYTHROCYTE [DISTWIDTH] IN BLOOD BY AUTOMATED COUNT: 14.6 % (ref 11.5–14.5)
EST. GFR  (AFRICAN AMERICAN): >60 ML/MIN/1.73 M^2
EST. GFR  (NON AFRICAN AMERICAN): >60 ML/MIN/1.73 M^2
GAMMA INTERFERON BACKGROUND BLD IA-ACNC: 0.02 IU/ML
GLUCOSE SERPL-MCNC: 103 MG/DL (ref 70–110)
HCT VFR BLD AUTO: 31.8 % (ref 40–54)
HGB BLD-MCNC: 10.4 G/DL (ref 14–18)
IMM GRANULOCYTES # BLD AUTO: 0.24 K/UL (ref 0–0.04)
IMM GRANULOCYTES NFR BLD AUTO: 1.3 % (ref 0–0.5)
LYMPHOCYTES # BLD AUTO: 1.6 K/UL (ref 1–4.8)
LYMPHOCYTES NFR BLD: 8.1 % (ref 18–48)
M TB IFN-G CD4+ BCKGRND COR BLD-ACNC: 0 IU/ML
MAGNESIUM SERPL-MCNC: 1.9 MG/DL (ref 1.6–2.6)
MCH RBC QN AUTO: 28.3 PG (ref 27–31)
MCHC RBC AUTO-ENTMCNC: 32.7 G/DL (ref 32–36)
MCV RBC AUTO: 87 FL (ref 82–98)
MITOGEN IGNF BCKGRD COR BLD-ACNC: 0.69 IU/ML
MONOCYTES # BLD AUTO: 1.9 K/UL (ref 0.3–1)
MONOCYTES NFR BLD: 9.7 % (ref 4–15)
NEUTROPHILS # BLD AUTO: 15.4 K/UL (ref 1.8–7.7)
NEUTROPHILS NFR BLD: 80.4 % (ref 38–73)
NRBC BLD-RTO: 0 /100 WBC
PHOSPHATE SERPL-MCNC: 2.5 MG/DL (ref 2.7–4.5)
PLATELET # BLD AUTO: 498 K/UL (ref 150–350)
PMV BLD AUTO: 10 FL (ref 9.2–12.9)
POTASSIUM SERPL-SCNC: 4.1 MMOL/L (ref 3.5–5.1)
PROCALCITONIN SERPL IA-MCNC: 0.94 NG/ML
PROT SERPL-MCNC: 5.9 G/DL (ref 6–8.4)
RBC # BLD AUTO: 3.67 M/UL (ref 4.6–6.2)
S PNEUM AG UR QL: NOT DETECTED
SODIUM SERPL-SCNC: 129 MMOL/L (ref 136–145)
TB GOLD PLUS: NEGATIVE
TB2 - NIL: 0.01 IU/ML
VANCOMYCIN TROUGH SERPL-MCNC: 5.2 UG/ML (ref 10–22)
WBC # BLD AUTO: 19.15 K/UL (ref 3.9–12.7)

## 2019-11-15 PROCEDURE — 63600175 PHARM REV CODE 636 W HCPCS: Performed by: INTERNAL MEDICINE

## 2019-11-15 PROCEDURE — 99232 PR SUBSEQUENT HOSPITAL CARE,LEVL II: ICD-10-PCS | Mod: ,,, | Performed by: INTERNAL MEDICINE

## 2019-11-15 PROCEDURE — 27000221 HC OXYGEN, UP TO 24 HOURS

## 2019-11-15 PROCEDURE — 25000242 PHARM REV CODE 250 ALT 637 W/ HCPCS: Performed by: INTERNAL MEDICINE

## 2019-11-15 PROCEDURE — 63600175 PHARM REV CODE 636 W HCPCS: Performed by: PHYSICIAN ASSISTANT

## 2019-11-15 PROCEDURE — 25000003 PHARM REV CODE 250: Performed by: INTERNAL MEDICINE

## 2019-11-15 PROCEDURE — 94640 AIRWAY INHALATION TREATMENT: CPT

## 2019-11-15 PROCEDURE — 83735 ASSAY OF MAGNESIUM: CPT

## 2019-11-15 PROCEDURE — 36415 COLL VENOUS BLD VENIPUNCTURE: CPT

## 2019-11-15 PROCEDURE — 94761 N-INVAS EAR/PLS OXIMETRY MLT: CPT

## 2019-11-15 PROCEDURE — 99232 SBSQ HOSP IP/OBS MODERATE 35: CPT | Mod: ,,, | Performed by: INTERNAL MEDICINE

## 2019-11-15 PROCEDURE — 99900035 HC TECH TIME PER 15 MIN (STAT)

## 2019-11-15 PROCEDURE — 25000242 PHARM REV CODE 250 ALT 637 W/ HCPCS: Performed by: PHYSICIAN ASSISTANT

## 2019-11-15 PROCEDURE — 25000003 PHARM REV CODE 250: Performed by: FAMILY MEDICINE

## 2019-11-15 PROCEDURE — 87522 HEPATITIS C REVRS TRNSCRPJ: CPT

## 2019-11-15 PROCEDURE — 84100 ASSAY OF PHOSPHORUS: CPT

## 2019-11-15 PROCEDURE — 85025 COMPLETE CBC W/AUTO DIFF WBC: CPT

## 2019-11-15 PROCEDURE — 20600001 HC STEP DOWN PRIVATE ROOM

## 2019-11-15 PROCEDURE — 80053 COMPREHEN METABOLIC PANEL: CPT

## 2019-11-15 PROCEDURE — 25000003 PHARM REV CODE 250: Performed by: PHYSICIAN ASSISTANT

## 2019-11-15 PROCEDURE — 80202 ASSAY OF VANCOMYCIN: CPT

## 2019-11-15 PROCEDURE — 94664 DEMO&/EVAL PT USE INHALER: CPT

## 2019-11-15 RX ORDER — CALCIUM CARBONATE 200(500)MG
500 TABLET,CHEWABLE ORAL 2 TIMES DAILY PRN
Status: DISCONTINUED | OUTPATIENT
Start: 2019-11-15 | End: 2019-11-18 | Stop reason: HOSPADM

## 2019-11-15 RX ORDER — LEVALBUTEROL INHALATION SOLUTION 0.63 MG/3ML
0.63 SOLUTION RESPIRATORY (INHALATION) ONCE
Status: COMPLETED | OUTPATIENT
Start: 2019-11-15 | End: 2019-11-15

## 2019-11-15 RX ORDER — IPRATROPIUM BROMIDE 0.5 MG/2.5ML
0.5 SOLUTION RESPIRATORY (INHALATION) ONCE
Status: COMPLETED | OUTPATIENT
Start: 2019-11-15 | End: 2019-11-15

## 2019-11-15 RX ORDER — POTASSIUM CHLORIDE 20 MEQ/1
40 TABLET, EXTENDED RELEASE ORAL ONCE
Status: COMPLETED | OUTPATIENT
Start: 2019-11-15 | End: 2019-11-15

## 2019-11-15 RX ORDER — SODIUM CHLORIDE 9 MG/ML
INJECTION, SOLUTION INTRAVENOUS CONTINUOUS
Status: ACTIVE | OUTPATIENT
Start: 2019-11-15 | End: 2019-11-15

## 2019-11-15 RX ADMIN — IPRATROPIUM BROMIDE AND ALBUTEROL SULFATE 3 ML: .5; 3 SOLUTION RESPIRATORY (INHALATION) at 01:11

## 2019-11-15 RX ADMIN — SODIUM CHLORIDE, SODIUM LACTATE, POTASSIUM CHLORIDE, AND CALCIUM CHLORIDE 500 ML: .6; .31; .03; .02 INJECTION, SOLUTION INTRAVENOUS at 03:11

## 2019-11-15 RX ADMIN — SODIUM CHLORIDE: 0.9 INJECTION, SOLUTION INTRAVENOUS at 08:11

## 2019-11-15 RX ADMIN — MAGNESIUM SULFATE HEPTAHYDRATE 1 G: 500 INJECTION, SOLUTION INTRAMUSCULAR; INTRAVENOUS at 12:11

## 2019-11-15 RX ADMIN — LEVALBUTEROL HYDROCHLORIDE 0.63 MG: 0.63 SOLUTION RESPIRATORY (INHALATION) at 05:11

## 2019-11-15 RX ADMIN — OXYCODONE HYDROCHLORIDE 5 MG: 5 TABLET ORAL at 03:11

## 2019-11-15 RX ADMIN — CEFTRIAXONE 2 G: 2 INJECTION, SOLUTION INTRAVENOUS at 08:11

## 2019-11-15 RX ADMIN — VANCOMYCIN HYDROCHLORIDE 1000 MG: 1 INJECTION, POWDER, LYOPHILIZED, FOR SOLUTION INTRAVENOUS at 03:11

## 2019-11-15 RX ADMIN — BENZONATATE 100 MG: 100 CAPSULE ORAL at 09:11

## 2019-11-15 RX ADMIN — MAGNESIUM OXIDE TAB 400 MG (241.3 MG ELEMENTAL MG) 400 MG: 400 (241.3 MG) TAB at 08:11

## 2019-11-15 RX ADMIN — ENOXAPARIN SODIUM 40 MG: 100 INJECTION SUBCUTANEOUS at 07:11

## 2019-11-15 RX ADMIN — VANCOMYCIN HYDROCHLORIDE 1000 MG: 1 INJECTION, POWDER, LYOPHILIZED, FOR SOLUTION INTRAVENOUS at 10:11

## 2019-11-15 RX ADMIN — IPRATROPIUM BROMIDE 0.5 MG: 0.5 SOLUTION RESPIRATORY (INHALATION) at 05:11

## 2019-11-15 RX ADMIN — VANCOMYCIN HYDROCHLORIDE 1000 MG: 1 INJECTION, POWDER, LYOPHILIZED, FOR SOLUTION INTRAVENOUS at 08:11

## 2019-11-15 RX ADMIN — IPRATROPIUM BROMIDE AND ALBUTEROL SULFATE 3 ML: .5; 3 SOLUTION RESPIRATORY (INHALATION) at 09:11

## 2019-11-15 RX ADMIN — IPRATROPIUM BROMIDE AND ALBUTEROL SULFATE 3 ML: .5; 3 SOLUTION RESPIRATORY (INHALATION) at 07:11

## 2019-11-15 RX ADMIN — CALCIUM CARBONATE (ANTACID) CHEW TAB 500 MG 500 MG: 500 CHEW TAB at 08:11

## 2019-11-15 RX ADMIN — GUAIFENESIN AND DEXTROMETHORPHAN HYDROBROMIDE 2 TABLET: 600; 30 TABLET, EXTENDED RELEASE ORAL at 08:11

## 2019-11-15 RX ADMIN — IPRATROPIUM BROMIDE AND ALBUTEROL SULFATE 3 ML: .5; 3 SOLUTION RESPIRATORY (INHALATION) at 12:11

## 2019-11-15 RX ADMIN — POTASSIUM CHLORIDE 40 MEQ: 20 TABLET, EXTENDED RELEASE ORAL at 12:11

## 2019-11-15 RX ADMIN — IPRATROPIUM BROMIDE AND ALBUTEROL SULFATE 3 ML: .5; 3 SOLUTION RESPIRATORY (INHALATION) at 03:11

## 2019-11-15 RX ADMIN — GUAIFENESIN AND DEXTROMETHORPHAN HYDROBROMIDE 2 TABLET: 600; 30 TABLET, EXTENDED RELEASE ORAL at 09:11

## 2019-11-15 NOTE — PROGRESS NOTES
[facility] Hospital Medicine  Progress Note    Patient Name: Hakeem Hunt  MRN: [id number]  Patient Class: IP- Inpatient   Admission Date: 11/11/2019  Length of Stay: 3 days  Attending Physician: Lorrie Vick MD  Primary Care Provider: Spencer Rivera MD    [facility] Medicine Team: [facility] MED D Lorrie Vick MD    Chief Complaint   Patient presents with    Chest Pain     Pt c/o productive cough, SOB, congestion, chest pain hurting worse with deep breathing/coughing and fevers since last week. Last ibuprofen at 1800 tonight. +smoker. Denies chest pain.      HPI: 44 y.o. male with no medical history presents to the ED complaining fever and flu-like symptoms for the past week.  + chills, cough productive of bloody sputum, shortness of breath, sore throat, dark-colored urine, unintentional weight loss, headache, lightheadedness, chest pain that is worse with coughing. No recent travels or known sick contacts no flu vax. He smokes daily, socially drinks alcohol, recreationally uses drugs, last IVDU 2 weeks ago.    Subjective:     Principal Problem:Septic shock    Interval History:  Having pleuritic chest pain with cough.  Febrile overnight with sweats; tachycardia noted.     Review of Systems   Constitutional: Negative for fever.   Respiratory: Positive for cough and chest tightness. Negative for shortness of breath.      Objective:     Vital Signs (Most Recent):  Temp: 98.1 °F (36.7 °C) (11/15/19 1602)  Pulse: (!) 113 (11/15/19 1602)  Resp: 18 (11/15/19 1602)  BP: (!) 100/51 (11/15/19 1602)  SpO2: 98 % (11/15/19 1602) Vital Signs (24h Range):  Temp:  [98.1 °F (36.7 °C)-100.1 °F (37.8 °C)] 98.1 °F (36.7 °C)  Pulse:  [] 113  Resp:  [16-20] 18  SpO2:  [88 %-99 %] 98 %  BP: ()/(50-59) 100/51     Weight: 62 kg (136 lb 11 oz)  Body mass index is 22.75 kg/m².  No intake or output data in the 24 hours ending 11/15/19 1740   Physical Exam   Constitutional: No distress.   Eyes:  Conjunctivae and lids are normal.   Cardiovascular: S1 normal and S2 normal.   Pulmonary/Chest: Effort normal. He has decreased breath sounds.   Abdominal: Soft. Bowel sounds are normal.   Musculoskeletal: He exhibits no edema.   Neurological: He is alert. He is not disoriented.     Significant Labs:   CBC:   Recent Labs   Lab 11/14/19  0506 11/14/19  2309 11/15/19  0426   WBC 19.68* 16.12* 19.15*   HGB 10.1* 10.0* 10.4*   HCT 30.8* 29.9* 31.8*   * 501* 498*     CMP:   Recent Labs   Lab 11/14/19  0506 11/14/19  2309 11/15/19  0426   * 132* 129*   K 3.1* 3.6 4.1   CL 98 100 98   CO2 23 25 25   * 96 103   BUN 8 7 5*   CREATININE 0.7 0.6 0.6   CALCIUM 7.9* 7.8* 7.9*   PROT 5.8*  --  5.9*   ALBUMIN 1.5*  --  1.5*   BILITOT 0.5  --  0.5   ALKPHOS 194*  --  175*   AST 79*  --  49*   *  --  102*   ANIONGAP 9 7* 6*   EGFRNONAA >60.0 >60.0 >60.0     Magnesium:   Recent Labs   Lab 11/14/19  0506 11/14/19  2309 11/15/19  0426   MG 1.7 1.8 1.9       Significant Imaging: CXR: I have reviewed all pertinent results/findings within the past 24 hours and my personal findings are:  Left upper lobe pneumonia    Assessment/Plan:      Active Diagnoses:    Diagnosis Date Noted POA    PRINCIPAL PROBLEM:  Septic shock [A41.9, R65.21] 11/12/2019 Yes    Pleurisy [R09.1] 11/14/2019 Yes    Hepatitis C antibody positive in blood [R76.8] 11/14/2019 Yes    Tobacco abuse [Z72.0] 11/14/2019 Yes    Left upper lobe pneumonia [J18.1] 11/13/2019 Yes    Elevated liver enzymes [R74.8] 11/13/2019 Yes    Leukocytosis [D72.829] 11/12/2019 Yes      Problems Resolved During this Admission:     Overview / Hospital Course:   Patient admitted for sepsis likely due to to pneumonia. TTE negative, no vegetations. BCx with NGTD. Quant gold, AFB culture/smear, resp panel, and  Legionella labs - all pending at transfer. Patient was weaned off levophed at 5AM on 11/12. Continued on broad spectrum antibiotics and transferred to Hospital  Medicine for further care on 11/13.     Inpatient Medications prescribed for management of Current Problems:   Scheduled Meds:    albuterol-ipratropium  3 mL Nebulization Q4H    cefTRIAXone (ROCEPHIN) IVPB  2 g Intravenous Q24H    dextromethorphan-guaifenesin  mg  2 tablet Oral BID    enoxaparin  40 mg Subcutaneous Daily    magnesium oxide  400 mg Oral Daily    vancomycin (VANCOCIN) IVPB  1,000 mg Intravenous Q8H     Continuous Infusions:    sodium chloride 0.9% 75 mL/hr at 11/15/19 0809     As Needed: acetaminophen, benzonatate, ketorolac, ondansetron, ondansetron, oxyCODONE, ramelteon, sodium chloride 0.9%, sodium chloride 0.9%    Assessment and Plan by Problem     Septic shock  Left upper lobe pneumonia  Shock ikely due to pneumonia. CXR with diffuse dense opacity of the left upper lobe  - Other possible etiologies include: TB, malignancy, infective endocarditis given his hx of IVDU   - Received vancomycin, Zosyn, azithromycin in ED which were continued  - Received 30cc/kg IVF in ED  - Required levophed for pressure support; weaned as tolerated.  - Antibiotics changed to cefepime, vancomycin, and azithromycin started. Recent weight loss, night sweats, fever for the last week.  He is homeless and in and out of prison. Rule out TB.  - F/U Quantiferon test and collect AFB cultures  - F/u Blood cx x3, and respiratory panel  - TT Echo without vegetations  - UA negative for infection  - HIV negative  - Influenza negative  - Awaiting final results of AFB smears x 3; respiratory sputum cultures and viral panel pending. De-escalated cephalosporin 11/15.  - QuantiFERON gold for TB and Legionella negative     Leukocytosis       Persistent     Elevated liver enzymes       Acute hepatitis panel reveals positive Hep C Ab.       Refer to Hep C Clinic.  Hepatitis C PCR pending    Diet Adult Regular (IDDSI Level 7) [facility]; Fluid - 1500mL    Significant LDAs:     HIGH RISK CONDITION(S):   Patient is  currently on drug therapy requiring intensive monitoring for toxicity: Vancomycin     Discharge plan and follow up  Home or Self Care    VTE Risk Mitigation (From admission, onward)         Ordered     enoxaparin injection 40 mg  Daily      11/12/19 0302     IP VTE HIGH RISK PATIENT  Once      11/12/19 0302                Lorrie Vick MD  Department of Hospital Medicine   [facility]

## 2019-11-15 NOTE — NURSING
The nurse went in to check telemetry leads on the patient. The nurse lifted the patient's shirt to find a saline flush syringe under his shirt with about 2 ml of fluid in it. The nurse disposed of the syringe in the sharps container.

## 2019-11-15 NOTE — PLAN OF CARE
Plan of care reviewed with patient. Patient HR remained tachy overnight. Safety maintained, call light in reach, bed in lowest position, will continue to monitor.

## 2019-11-15 NOTE — PHYSICIAN QUERY
PT Name: Hakeem Hunt  MR #: [id number]     PHYSICIAN QUERY -  ELECTROLYTE CLARIFICATION      CDS/: Jodie Stout               Contact information:[email or web address]  This form is a permanent document in the medical record.     Query Date: November 15, 2019    By submitting this query, we are merely seeking further clarification of documentation to reflect the severity of illness of your patient. Please utilize your independent clinical judgment when addressing the question(s) below.    The Medical record reflects the following:     Indicators   Supporting Clinical Findings Location in Medical Record   x Lab Value(s) Sodium = 130, 134, 130, 132, 129 Labs 11/12-11/15   x Treatment                                 Medication NS Infusion at 75cc/hr    NS bolus IV 2700cc    NS bolus IV 500cc MAR 11/15    MAR 11/11-11/12    MAR 11/14    Other       Provider, please specify the diagnosis or diagnoses that correspond(s) to the above indicators. Reynaldo all that apply:    [ x ] Hyponatremia   [   ] Other electrolyte disturbance (please specify): _______   [   ]  Clinically Undetermined       Please document in your progress notes daily for the duration of treatment until resolved, and include in your discharge summary.

## 2019-11-15 NOTE — PLAN OF CARE
Problem: Adult Inpatient Plan of Care  Goal: Plan of Care Review  Description  Dx: Septic shock    No PMHx    11/11: ED after 1 week of SOB, flu like symptoms, Bloody sputum. Levo Started. SICU -- levo off        POC reviewed with pt. Questions invited and asked. Pt has a password set up so that the sister may have information. VS stable, A&Ox4. Pt has a cough. MD gave prn medication to help with the cough. Pt verbalized understanding of all care. Will continue to monitor.   Outcome: Ongoing, Progressing  Flowsheets (Taken 11/14/2019 1801)  Plan of Care Reviewed With: patient  Goal: Patient-Specific Goal (Individualization)  Outcome: Ongoing, Progressing   Keep patient updated. Pt updated about care. Pt verbalized  understanding.   Problem: Fall Injury Risk  Goal: Absence of Fall and Fall-Related Injury  Outcome: Ongoing, Progressing   POC reviewed with pt. Pt verbalized understanding.

## 2019-11-15 NOTE — PLAN OF CARE
SW met with pt and provided him with list of homeless shelters and resources.    Meaghan Wong, VIKTORIYA  [facility]  [phone]

## 2019-11-15 NOTE — PROGRESS NOTES
Pharmacokinetic Assessment Follow Up: IV Vancomycin    Trough was 2.5 mcg/ml.   3rd dose was given 5 hours late (lost IV access at one point per progress notes)      Vancomycin serum concentration assessment and plan:    · Will order a 500mg supplemental dose now since trough is so low.  · Due to patient age and clearance, likely needs q8h dosing so will increase maintenance dose to 1 gram q8h.  · Order trough with 3rd dose.          Drug levels (last 3 results):  Recent Labs   Lab Result Units 11/14/19  1744   Vancomycin-Trough ug/mL 2.5*       Pharmacy will continue to follow and monitor vancomycin.    Please contact pharmacy at extension [phone] for questions regarding this assessment.    Thank you for the consult,   Shaila Raphael       Patient brief summary:  Hakeem Hunt is a 44 y.o. male initiated on antimicrobial therapy with IV Vancomycin for treatment of bacteremia    Drug Allergies:   Review of patient's allergies indicates:   Allergen Reactions    Pcn [penicillins] Rash       Actual Body Weight:   62 kg    Renal Function:   Estimated Creatinine Clearance: 119.6 mL/min (based on SCr of 0.7 mg/dL).,     CBC (last 72 hours):  Recent Labs   Lab Result Units 11/11/19  2242 11/12/19  0333 11/13/19  0435 11/14/19  0506   WBC K/uL 21.51* 25.06* 30.12* 19.68*   Hemoglobin g/dL 13.0* 12.4* 11.1* 10.1*   Hematocrit % 38.7* 38.1* 33.6* 30.8*   Platelets K/uL 423* 421* 415* 466*   Gran% % 85.1* 86.2* 85.3* 83.0*   Lymph% % 6.9* 6.3* 5.9* 8.8*   Mono% % 6.3 5.6 6.9 6.5   Eosinophil% % 0.1 0.3 0.1 0.2   Basophil% % 0.4 0.3 0.3 0.3   Differential Method  Automated Automated Automated Automated       Metabolic Panel (last 72 hours):  Recent Labs   Lab Result Units 11/12/19  0000 11/12/19  0159 11/12/19  0333 11/13/19  0435 11/14/19  0506   Sodium mmol/L 130*  --  134* 134* 130*   Potassium mmol/L 3.7  --  3.0* 3.8 3.1*   Chloride mmol/L 99  --  104 102 98   CO2 mmol/L 21*  --  21* 23 23   Glucose mg/dL 116*  --   119* 98 144*   Glucose, UA   --  Negative  --   --   --    BUN, Bld mg/dL 10  --  7 7 8   Creatinine mg/dL 0.7  --  0.7 0.6 0.7   Albumin g/dL 1.7*  --  1.7* 1.5* 1.5*   Total Bilirubin mg/dL 0.9  --  0.6 0.9 0.5   Alkaline Phosphatase U/L 140*  --  173* 230* 194*   AST U/L 36  --  107* 85* 79*   ALT U/L 68*  --  96* 144* 122*   Magnesium mg/dL  --   --   --  1.7 1.7   Phosphorus mg/dL  --   --   --  2.9 2.4*       Vancomycin Administrations:  vancomycin given in the last 96 hours                   vancomycin in dextrose 5 % 1 gram/250 mL IVPB 1,000 mg (mg) 1,000 mg New Bag 11/14/19 1849     1,000 mg New Bag  0406     1,000 mg New Bag 11/13/19 1619     1,000 mg New Bag 11/12/19 2318     1,000 mg New Bag  1105                Microbiologic Results:  Microbiology Results (last 7 days)     Procedure Component Value Units Date/Time    AFB Culture & Smear [[id number]]     Order Status:  No result Specimen:  Respiratory from Sputum, Expectorated     AFB Culture & Smear [[id number]]     Order Status:  Canceled Specimen:  Respiratory from Sputum, Expectorated     AFB Culture & Smear [[id number]]     Order Status:  Canceled Specimen:  Respiratory from Sputum, Expectorated     Blood culture [[id number]] Collected:  11/12/19 0417    Order Status:  Completed Specimen:  Blood from Peripheral, Hand, Left Updated:  11/14/19 0622     Blood Culture, Routine No Growth to date      No Growth to date      No Growth to date    Blood Culture #2 **CANNOT BE ORDERED STAT** [[id number]] Collected:  11/11/19 2240    Order Status:  Completed Specimen:  Blood from Peripheral, Forearm, Right Updated:  11/14/19 0612     Blood Culture, Routine No Growth to date      No Growth to date      No Growth to date    Blood Culture #1 **CANNOT BE ORDERED STAT** [[id number]] Collected:  11/11/19 2225    Order Status:  Completed Specimen:  Blood from Peripheral, Upper Arm, Right Updated:  11/14/19 0612     Blood Culture, Routine No Growth to date      No Growth to  date      No Growth to date    AFB Culture & Smear [[id number]]     Order Status:  Canceled Specimen:  Respiratory from Sputum, Expectorated     AFB Culture & Smear [[id number]] Collected:  11/12/19 1007    Order Status:  Completed Specimen:  Respiratory from Sputum, Expectorated Updated:  11/13/19 2127     AFB Culture & Smear Culture in progress     AFB CULTURE STAIN No acid fast bacilli seen.    Culture, Respiratory with Gram Stain [[id number]] Collected:  11/13/19 1817    Order Status:  Sent Specimen:  Respiratory from Sputum Updated:  11/13/19 1817    Respiratory Viral Panel by PCR Kirillner; Sputum [[id number]] Collected:  11/13/19 1648    Order Status:  Sent Specimen:  Respiratory Updated:  11/13/19 1811    AFB Culture & Smear [[id number]]     Order Status:  Canceled Specimen:  Body Fluid from Sputum, Expectorated     Respiratory Infection Panel, Nasopharyngeal [[id number]] Collected:  11/12/19 1226    Order Status:  Completed Specimen:  Nasopharyngeal Swab Updated:  11/12/19 2025     Respiratory Infection Panel Source NP Swab     Adenovirus Not Detected     Coronavirus 229E Not Detected     Coronavirus HKU1 Not Detected     Coronavirus NL63 Not Detected     Coronavirus OC43 Not Detected     Human Metapneumovirus Not Detected     Human Rhinovirus/Enterovirus Not Detected     Influenza A (subtypes H1, H1-2009,H3) Not Detected     Influenza B Not Detected     Parainfluenza Virus 1 Not Detected     Parainfluenza Virus 2 Not Detected     Parainfluenza Virus 3 Not Detected     Parainfluenza Virus 4 Not Detected     Respiratory Syncytial Virus Not Detected     Bordetella Parapertussis (BO0630) Not Detected     Bordetella pertussis (ptxP) Not Detected     Chlamydia pneumoniae Not Detected     Mycoplasma pneumoniae Not Detected    Narrative:       For all other respiratory sources order [id number] Respiratory  Viral Panel by PCR (RVPCR)    AFB Culture & Smear [[id number]] Collected:  11/12/19 1007    Order Status:  Canceled  Specimen:  Respiratory from Sputum, Expectorated     AFB Culture & Smear [[id number]]     Order Status:  Canceled Specimen:  Respiratory from Sputum, Expectorated     Culture, Respiratory with Gram Stain [[id number]]     Order Status:  Canceled Specimen:  Respiratory from Sputum

## 2019-11-16 LAB
ALBUMIN SERPL BCP-MCNC: 1.4 G/DL (ref 3.5–5.2)
ALP SERPL-CCNC: 145 U/L (ref 55–135)
ALT SERPL W/O P-5'-P-CCNC: 71 U/L (ref 10–44)
AMPHET+METHAMPHET UR QL: NORMAL
ANION GAP SERPL CALC-SCNC: 8 MMOL/L (ref 8–16)
AST SERPL-CCNC: 29 U/L (ref 10–40)
BARBITURATES UR QL SCN>200 NG/ML: NEGATIVE
BASOPHILS # BLD AUTO: 0.05 K/UL (ref 0–0.2)
BASOPHILS NFR BLD: 0.4 % (ref 0–1.9)
BENZODIAZ UR QL SCN>200 NG/ML: NEGATIVE
BILIRUB SERPL-MCNC: 0.4 MG/DL (ref 0.1–1)
BUN SERPL-MCNC: 6 MG/DL (ref 6–20)
BZE UR QL SCN: NEGATIVE
CALCIUM SERPL-MCNC: 7.9 MG/DL (ref 8.7–10.5)
CANNABINOIDS UR QL SCN: NEGATIVE
CHLORIDE SERPL-SCNC: 100 MMOL/L (ref 95–110)
CO2 SERPL-SCNC: 24 MMOL/L (ref 23–29)
CREAT SERPL-MCNC: 0.6 MG/DL (ref 0.5–1.4)
CREAT UR-MCNC: 67 MG/DL (ref 23–375)
DIFFERENTIAL METHOD: ABNORMAL
EOSINOPHIL # BLD AUTO: 0.2 K/UL (ref 0–0.5)
EOSINOPHIL NFR BLD: 1.1 % (ref 0–8)
ERYTHROCYTE [DISTWIDTH] IN BLOOD BY AUTOMATED COUNT: 14.7 % (ref 11.5–14.5)
EST. GFR  (AFRICAN AMERICAN): >60 ML/MIN/1.73 M^2
EST. GFR  (NON AFRICAN AMERICAN): >60 ML/MIN/1.73 M^2
ETHANOL UR-MCNC: <10 MG/DL
GLUCOSE SERPL-MCNC: 99 MG/DL (ref 70–110)
HCT VFR BLD AUTO: 28 % (ref 40–54)
HGB BLD-MCNC: 9.2 G/DL (ref 14–18)
IMM GRANULOCYTES # BLD AUTO: 0.18 K/UL (ref 0–0.04)
IMM GRANULOCYTES NFR BLD AUTO: 1.3 % (ref 0–0.5)
LYMPHOCYTES # BLD AUTO: 2 K/UL (ref 1–4.8)
LYMPHOCYTES NFR BLD: 13.8 % (ref 18–48)
MAGNESIUM SERPL-MCNC: 2 MG/DL (ref 1.6–2.6)
MCH RBC QN AUTO: 28.8 PG (ref 27–31)
MCHC RBC AUTO-ENTMCNC: 32.9 G/DL (ref 32–36)
MCV RBC AUTO: 88 FL (ref 82–98)
METHADONE UR QL SCN>300 NG/ML: NEGATIVE
MONOCYTES # BLD AUTO: 1.5 K/UL (ref 0.3–1)
MONOCYTES NFR BLD: 10.8 % (ref 4–15)
NEUTROPHILS # BLD AUTO: 10.3 K/UL (ref 1.8–7.7)
NEUTROPHILS NFR BLD: 72.6 % (ref 38–73)
NRBC BLD-RTO: 0 /100 WBC
OPIATES UR QL SCN: NORMAL
PCP UR QL SCN>25 NG/ML: NEGATIVE
PHOSPHATE SERPL-MCNC: 4 MG/DL (ref 2.7–4.5)
PLATELET # BLD AUTO: 590 K/UL (ref 150–350)
PMV BLD AUTO: 9.7 FL (ref 9.2–12.9)
POTASSIUM SERPL-SCNC: 3.9 MMOL/L (ref 3.5–5.1)
PROT SERPL-MCNC: 5.9 G/DL (ref 6–8.4)
RBC # BLD AUTO: 3.19 M/UL (ref 4.6–6.2)
SODIUM SERPL-SCNC: 132 MMOL/L (ref 136–145)
TOXICOLOGY INFORMATION: NORMAL
VANCOMYCIN TROUGH SERPL-MCNC: 8.7 UG/ML (ref 10–22)
VANCOMYCIN TROUGH SERPL-MCNC: 8.8 UG/ML (ref 10–22)
WBC # BLD AUTO: 14.17 K/UL (ref 3.9–12.7)

## 2019-11-16 PROCEDURE — 80202 ASSAY OF VANCOMYCIN: CPT

## 2019-11-16 PROCEDURE — 20600001 HC STEP DOWN PRIVATE ROOM

## 2019-11-16 PROCEDURE — 99232 PR SUBSEQUENT HOSPITAL CARE,LEVL II: ICD-10-PCS | Mod: ,,, | Performed by: INTERNAL MEDICINE

## 2019-11-16 PROCEDURE — 94664 DEMO&/EVAL PT USE INHALER: CPT

## 2019-11-16 PROCEDURE — 25000242 PHARM REV CODE 250 ALT 637 W/ HCPCS: Performed by: INTERNAL MEDICINE

## 2019-11-16 PROCEDURE — 63600175 PHARM REV CODE 636 W HCPCS: Performed by: INTERNAL MEDICINE

## 2019-11-16 PROCEDURE — 94640 AIRWAY INHALATION TREATMENT: CPT

## 2019-11-16 PROCEDURE — 87070 CULTURE OTHR SPECIMN AEROBIC: CPT

## 2019-11-16 PROCEDURE — 36415 COLL VENOUS BLD VENIPUNCTURE: CPT

## 2019-11-16 PROCEDURE — 87206 SMEAR FLUORESCENT/ACID STAI: CPT

## 2019-11-16 PROCEDURE — 27000221 HC OXYGEN, UP TO 24 HOURS

## 2019-11-16 PROCEDURE — 25000003 PHARM REV CODE 250: Performed by: INTERNAL MEDICINE

## 2019-11-16 PROCEDURE — 27000646 HC AEROBIKA DEVICE

## 2019-11-16 PROCEDURE — 99900035 HC TECH TIME PER 15 MIN (STAT)

## 2019-11-16 PROCEDURE — 80053 COMPREHEN METABOLIC PANEL: CPT

## 2019-11-16 PROCEDURE — 94761 N-INVAS EAR/PLS OXIMETRY MLT: CPT

## 2019-11-16 PROCEDURE — 85025 COMPLETE CBC W/AUTO DIFF WBC: CPT

## 2019-11-16 PROCEDURE — 80202 ASSAY OF VANCOMYCIN: CPT | Mod: 91

## 2019-11-16 PROCEDURE — 87116 MYCOBACTERIA CULTURE: CPT

## 2019-11-16 PROCEDURE — 83735 ASSAY OF MAGNESIUM: CPT

## 2019-11-16 PROCEDURE — 87015 SPECIMEN INFECT AGNT CONCNTJ: CPT

## 2019-11-16 PROCEDURE — 87205 SMEAR GRAM STAIN: CPT

## 2019-11-16 PROCEDURE — 99232 SBSQ HOSP IP/OBS MODERATE 35: CPT | Mod: ,,, | Performed by: INTERNAL MEDICINE

## 2019-11-16 PROCEDURE — 80307 DRUG TEST PRSMV CHEM ANLYZR: CPT

## 2019-11-16 PROCEDURE — 84100 ASSAY OF PHOSPHORUS: CPT

## 2019-11-16 RX ADMIN — VANCOMYCIN HYDROCHLORIDE 1250 MG: 1.25 INJECTION, POWDER, LYOPHILIZED, FOR SOLUTION INTRAVENOUS at 06:11

## 2019-11-16 RX ADMIN — RAMELTEON 8 MG: 8 TABLET ORAL at 08:11

## 2019-11-16 RX ADMIN — OXYCODONE HYDROCHLORIDE 5 MG: 5 TABLET ORAL at 08:11

## 2019-11-16 RX ADMIN — CEFTRIAXONE 2 G: 2 INJECTION, SOLUTION INTRAVENOUS at 08:11

## 2019-11-16 RX ADMIN — IPRATROPIUM BROMIDE AND ALBUTEROL SULFATE 3 ML: .5; 3 SOLUTION RESPIRATORY (INHALATION) at 01:11

## 2019-11-16 RX ADMIN — BENZONATATE 100 MG: 100 CAPSULE ORAL at 08:11

## 2019-11-16 RX ADMIN — MAGNESIUM OXIDE TAB 400 MG (241.3 MG ELEMENTAL MG) 400 MG: 400 (241.3 MG) TAB at 08:11

## 2019-11-16 RX ADMIN — OXYCODONE HYDROCHLORIDE 5 MG: 5 TABLET ORAL at 02:11

## 2019-11-16 RX ADMIN — IPRATROPIUM BROMIDE AND ALBUTEROL SULFATE 3 ML: .5; 3 SOLUTION RESPIRATORY (INHALATION) at 04:11

## 2019-11-16 RX ADMIN — ENOXAPARIN SODIUM 40 MG: 100 INJECTION SUBCUTANEOUS at 06:11

## 2019-11-16 RX ADMIN — VANCOMYCIN HYDROCHLORIDE 1250 MG: 1.25 INJECTION, POWDER, LYOPHILIZED, FOR SOLUTION INTRAVENOUS at 11:11

## 2019-11-16 RX ADMIN — IPRATROPIUM BROMIDE AND ALBUTEROL SULFATE 3 ML: .5; 3 SOLUTION RESPIRATORY (INHALATION) at 11:11

## 2019-11-16 RX ADMIN — GUAIFENESIN AND DEXTROMETHORPHAN HYDROBROMIDE 2 TABLET: 600; 30 TABLET, EXTENDED RELEASE ORAL at 08:11

## 2019-11-16 RX ADMIN — IPRATROPIUM BROMIDE AND ALBUTEROL SULFATE 3 ML: .5; 3 SOLUTION RESPIRATORY (INHALATION) at 08:11

## 2019-11-16 RX ADMIN — OXYCODONE HYDROCHLORIDE 5 MG: 5 TABLET ORAL at 01:11

## 2019-11-16 RX ADMIN — VANCOMYCIN HYDROCHLORIDE 1000 MG: 1 INJECTION, POWDER, LYOPHILIZED, FOR SOLUTION INTRAVENOUS at 03:11

## 2019-11-16 NOTE — NURSING
Walked in patient room. Strong odor of marijuana. 4 visitors in room. 1 noted with pipe in hand. Patient pass a syringe with clear liquid in it to one of the visitors. Charge nurse and house supervisor notified.

## 2019-11-16 NOTE — CARE UPDATE
Patient found on room air with 78% sats. Patient placed on nasal cannula @ 5 liters with sats increasing to 94%. Patient complained of chest pain. Will notify patient's nurse and will continue to monitor for any other changes in respiratory status. Nurse Jodie notified of patient's C/O chest pain.

## 2019-11-16 NOTE — PROGRESS NOTES
[facility] Hospital Medicine  Progress Note    Patient Name: Hakeem Hunt  MRN: [id number]  Patient Class: IP- Inpatient   Admission Date: 11/11/2019  Length of Stay: 4 days  Attending Physician: Lorrie Vick MD  Primary Care Provider: Spencer Rivera MD    [facility] Medicine Team: [facility]OSP MED D Lorrie Vick MD    Chief Complaint   Patient presents with    Chest Pain     Pt c/o productive cough, SOB, congestion, chest pain hurting worse with deep breathing/coughing and fevers since last week. Last ibuprofen at 1800 tonight. +smoker. Denies chest pain.      HPI: 44 y.o. male with no medical history presents to the ED complaining fever and flu-like symptoms for the past week.  + chills, cough productive of bloody sputum, shortness of breath, sore throat, dark-colored urine, unintentional weight loss, headache, lightheadedness, chest pain that is worse with coughing. No recent travels or known sick contacts no flu vax. He smokes daily, socially drinks alcohol, recreationally uses drugs, last IVDU 2 weeks ago.    Subjective:     Principal Problem:Septic shock    Interval History:  Having pleuritic chest pain with cough. Events overnight noted. Now, no visitors allowed. Persistent hypoxia.    Review of Systems   Constitutional: Negative for fever.   Respiratory: Positive for cough and chest tightness. Negative for shortness of breath.      Objective:     Vital Signs (Most Recent):  Temp: 98.5 °F (36.9 °C) (11/16/19 1125)  Pulse: (!) 123 (11/16/19 1145)  Resp: (!) 22 (11/16/19 1145)  BP: (!) 125/56 (11/16/19 1125)  SpO2: (!) 90 % (11/16/19 1145) Vital Signs (24h Range):  Temp:  [97.5 °F (36.4 °C)-98.5 °F (36.9 °C)] 98.5 °F (36.9 °C)  Pulse:  [] 123  Resp:  [16-24] 22  SpO2:  [88 %-98 %] 90 %  BP: ()/(51-83) 125/56     Weight: (P) 63 kg (138 lb 14.2 oz)  Body mass index is 23.11 kg/m² (pended).  No intake or output data in the 24 hours ending 11/16/19 1417   Physical Exam    Constitutional: No distress.   Eyes: Conjunctivae and lids are normal.   Cardiovascular: S1 normal and S2 normal.   Pulmonary/Chest: Effort normal. He has decreased breath sounds. He has rhonchi in the left upper field.   Abdominal: Soft. Bowel sounds are normal.   Musculoskeletal: He exhibits no edema.   Neurological: He is alert. He is not disoriented.     Significant Labs:   CBC:   Recent Labs   Lab 11/14/19  2309 11/15/19  0426 11/16/19  0332   WBC 16.12* 19.15* 14.17*   HGB 10.0* 10.4* 9.2*   HCT 29.9* 31.8* 28.0*   * 498* 590*     CMP:   Recent Labs   Lab 11/14/19  2309 11/15/19  0426 11/16/19  0332   * 129* 132*   K 3.6 4.1 3.9    98 100   CO2 25 25 24   GLU 96 103 99   BUN 7 5* 6   CREATININE 0.6 0.6 0.6   CALCIUM 7.8* 7.9* 7.9*   PROT  --  5.9* 5.9*   ALBUMIN  --  1.5* 1.4*   BILITOT  --  0.5 0.4   ALKPHOS  --  175* 145*   AST  --  49* 29   ALT  --  102* 71*   ANIONGAP 7* 6* 8   EGFRNONAA >60.0 >60.0 >60.0     Magnesium:   Recent Labs   Lab 11/14/19  2309 11/15/19  0426 11/16/19  0332   MG 1.8 1.9 2.0       Significant Imaging: CXR: I have reviewed all pertinent results/findings within the past 24 hours and my personal findings are:  Left upper lobe pneumonia, worsening consolidation    Assessment/Plan:      Active Diagnoses:    Diagnosis Date Noted POA    PRINCIPAL PROBLEM:  Septic shock [A41.9, R65.21] 11/12/2019 Yes    Pleurisy [R09.1] 11/14/2019 Yes    Hepatitis C antibody positive in blood [R76.8] 11/14/2019 Yes    Tobacco abuse [Z72.0] 11/14/2019 Yes    Left upper lobe pneumonia [J18.1] 11/13/2019 Yes    Elevated liver enzymes [R74.8] 11/13/2019 Yes    Leukocytosis [D72.829] 11/12/2019 Yes      Problems Resolved During this Admission:     Overview / Hospital Course:   Patient admitted for sepsis likely due to to pneumonia. TTE negative, no vegetations. BCx with NGTD. Quant gold, AFB culture/smear, resp panel, and  Legionella labs - all pending at transfer. Patient was  weaned off levophed at 5AM on 11/12. Continued on broad spectrum antibiotics and transferred to Hospital Medicine for further care on 11/13.     Inpatient Medications prescribed for management of Current Problems:   Scheduled Meds:    albuterol-ipratropium  3 mL Nebulization Q4H    cefTRIAXone (ROCEPHIN) IVPB  2 g Intravenous Q24H    dextromethorphan-guaifenesin  mg  2 tablet Oral BID    enoxaparin  40 mg Subcutaneous Daily    magnesium oxide  400 mg Oral Daily    vancomycin (VANCOCIN) IVPB  1,250 mg Intravenous Q8H     Continuous Infusions:     As Needed: acetaminophen, benzonatate, calcium carbonate, ondansetron, ondansetron, oxyCODONE, ramelteon, sodium chloride 0.9%, sodium chloride 0.9%    Assessment and Plan by Problem     Septic shock  Left upper lobe pneumonia  Shock ikely due to pneumonia. CXR with diffuse dense opacity of the left upper lobe  - Other possible etiologies include: TB, malignancy, infective endocarditis given his hx of IVDU   - Received vancomycin, Zosyn, azithromycin in ED which were continued  - Received 30cc/kg IVF in ED  - Required levophed for pressure support; weaned as tolerated.  - Antibiotics changed to cefepime, vancomycin, and azithromycin started. Recent weight loss, night sweats, fever for the last week.  He is homeless and in and out of halfway. Rule out TB.  - F/U Quantiferon test and collect AFB cultures  - F/u Blood cx x3, and respiratory panel  - TT Echo without vegetations  - UA negative for infection  - HIV negative  - Influenza negative  - Awaiting final results of AFB smears x 3; respiratory sputum cultures and viral panel were not received by laboratory. De-escalated cephalosporin 11/15.  - QuantiFERON gold for TB and Legionella negative.   - CXR shows worsening consolidation 11/16. Worsening hypoxia. WBC down-trending and afebrile.     Leukocytosis       Persistent     Elevated liver enzymes       Acute hepatitis panel reveals positive Hep C Ab.        Refer to Hep C Clinic.  Hepatitis C PCR pending    Diet Adult Regular (IDDSI Level 7) [facility]; Fluid - 1500mL    Significant LDAs:     HIGH RISK CONDITION(S):   Patient is currently on drug therapy requiring intensive monitoring for toxicity: Vancomycin     Discharge plan and follow up  Home or Self Care    VTE Risk Mitigation (From admission, onward)         Ordered     enoxaparin injection 40 mg  Daily      11/12/19 0302     IP VTE HIGH RISK PATIENT  Once      11/12/19 0302                Lorrie Vick MD  Department of Hospital Medicine   [facility]

## 2019-11-16 NOTE — PROGRESS NOTES
Pharmacokinetic Assessment Follow Up: IV Vancomycin    Vancomycin serum concentration assessment(s):    The trough level was drawn correctly and can be used to guide therapy at this time. The measurement is below the desired definitive target range of 15 to 20 mcg/mL.     -- Trough resulted at 8.8 on the regimen of 1g q 8 hours    Vancomycin Regimen Plan:    Change regimen to Vancomycin 1250 mg IV every 8 hours with next serum trough concentration measured at 1030 prior to 4th dose on 11/17    Drug levels (last 3 results):  Recent Labs   Lab Result Units 11/14/19  1744 11/15/19  1157 11/16/19  0332   Vancomycin-Trough ug/mL 2.5* 5.2* 8.8*  8.7*       Pharmacy will continue to follow and monitor vancomycin.    Please contact pharmacy at extension [phone] for questions regarding this assessment.    Thank you for the consult,     Linda Moss, PharmD  PGY-2 Pharmacy Resident- Internal Medicine  [phone]       Patient brief summary:  Hakeem Hunt is a 44 y.o. male initiated on antimicrobial therapy with IV Vancomycin for treatment of lower respiratory infection    The patient's current regimen is 1 g q 8 h    Drug Allergies:   Review of patient's allergies indicates:   Allergen Reactions    Pcn [penicillins] Rash       Actual Body Weight:   62 kg    Renal Function:   Estimated Creatinine Clearance: 139.5 mL/min (based on SCr of 0.6 mg/dL).,     Dialysis Method (if applicable):  N/A    CBC (last 72 hours):  Recent Labs   Lab Result Units 11/14/19  0506 11/14/19  2309 11/15/19  0426 11/16/19  0332   WBC K/uL 19.68* 16.12* 19.15* 14.17*   Hemoglobin g/dL 10.1* 10.0* 10.4* 9.2*   Hematocrit % 30.8* 29.9* 31.8* 28.0*   Platelets K/uL 466* 501* 498* 590*   Gran% % 83.0* 75.3* 80.4* 72.6   Lymph% % 8.8* 13.1* 8.1* 13.8*   Mono% % 6.5 10.0 9.7 10.8   Eosinophil% % 0.2 0.4 0.2 1.1   Basophil% % 0.3 0.3 0.3 0.4   Differential Method  Automated Automated Automated Automated       Metabolic Panel (last 72 hours):  Recent  Labs   Lab Result Units 11/14/19  0506 11/14/19  2309 11/15/19  0426 11/16/19  0332   Sodium mmol/L 130* 132* 129* 132*   Potassium mmol/L 3.1* 3.6 4.1 3.9   Chloride mmol/L 98 100 98 100   CO2 mmol/L 23 25 25 24   Glucose mg/dL 144* 96 103 99   BUN, Bld mg/dL 8 7 5* 6   Creatinine mg/dL 0.7 0.6 0.6 0.6   Albumin g/dL 1.5*  --  1.5* 1.4*   Total Bilirubin mg/dL 0.5  --  0.5 0.4   Alkaline Phosphatase U/L 194*  --  175* 145*   AST U/L 79*  --  49* 29   ALT U/L 122*  --  102* 71*   Magnesium mg/dL 1.7 1.8 1.9 2.0   Phosphorus mg/dL 2.4* 3.1 2.5* 4.0       Vancomycin Administrations:  vancomycin given in the last 96 hours                   vancomycin in dextrose 5 % 1 gram/250 mL IVPB 1,000 mg (mg) 1,000 mg New Bag 11/16/19 0354     1,000 mg New Bag 11/15/19 2002      Restarted  1130     1,000 mg New Bag  1032     1,000 mg New Bag  0316    vancomycin (VANCOCIN) 500 mg in dextrose 5 % 100 mL IVPB (mg) 500 mg New Bag 11/14/19 2043    vancomycin in dextrose 5 % 1 gram/250 mL IVPB 1,000 mg (mg) 1,000 mg New Bag 11/14/19 1849     1,000 mg New Bag  0406     1,000 mg New Bag 11/13/19 1619     1,000 mg New Bag 11/12/19 2318     1,000 mg New Bag  1105                Microbiologic Results:  Microbiology Results (last 7 days)     Procedure Component Value Units Date/Time    AFB Culture & Smear [[id number]] Collected:  11/14/19 2000    Order Status:  Completed Specimen:  Respiratory from Sputum, Expectorated Updated:  11/16/19 0927     AFB Culture & Smear Culture in progress     AFB CULTURE STAIN No acid fast bacilli seen.    Blood culture [[id number]] Collected:  11/12/19 0417    Order Status:  Completed Specimen:  Blood from Peripheral, Hand, Left Updated:  11/16/19 0622     Blood Culture, Routine No Growth to date      No Growth to date      No Growth to date      No Growth to date      No Growth to date    Blood culture [[id number]] Collected:  11/14/19 2302    Order Status:  Completed Specimen:  Blood Updated:  11/16/19 0612      Blood Culture, Routine No Growth to date      No Growth to date    Blood culture [[id number]] Collected:  11/14/19 2309    Order Status:  Completed Specimen:  Blood Updated:  11/16/19 0612     Blood Culture, Routine No Growth to date      No Growth to date    Blood Culture #2 **CANNOT BE ORDERED STAT** [[id number]] Collected:  11/11/19 2240    Order Status:  Completed Specimen:  Blood from Peripheral, Forearm, Right Updated:  11/16/19 0612     Blood Culture, Routine No Growth to date      No Growth to date      No Growth to date      No Growth to date      No Growth to date    Blood Culture #1 **CANNOT BE ORDERED STAT** [[id number]] Collected:  11/11/19 2225    Order Status:  Completed Specimen:  Blood from Peripheral, Upper Arm, Right Updated:  11/16/19 0612     Blood Culture, Routine No Growth to date      No Growth to date      No Growth to date      No Growth to date      No Growth to date    AFB Culture & Smear [[id number]]     Order Status:  No result Specimen:  Respiratory from Sputum, Expectorated     AFB Culture & Smear [[id number]]     Order Status:  Canceled Specimen:  Respiratory from Sputum, Expectorated     AFB Culture & Smear [[id number]]     Order Status:  Canceled Specimen:  Respiratory from Sputum, Expectorated     AFB Culture & Smear [[id number]]     Order Status:  Canceled Specimen:  Respiratory from Sputum, Expectorated     AFB Culture & Smear [[id number]]     Order Status:  Canceled Specimen:  Respiratory from Sputum, Expectorated     AFB Culture & Smear [[id number]]     Order Status:  Canceled Specimen:  Respiratory from Sputum, Expectorated     AFB Culture & Smear [[id number]] Collected:  11/12/19 1007    Order Status:  Completed Specimen:  Respiratory from Sputum, Expectorated Updated:  11/13/19 2127     AFB Culture & Smear Culture in progress     AFB CULTURE STAIN No acid fast bacilli seen.    Culture, Respiratory with Gram Stain [[id number]] Collected:  11/13/19 1817    Order Status:  Sent Specimen:   Respiratory from Sputum Updated:  11/13/19 1817    Respiratory Viral Panel by PCR [facility]; Sputum [[id number]] Collected:  11/13/19 1648    Order Status:  Sent Specimen:  Respiratory Updated:  11/13/19 1811    AFB Culture & Smear [[id number]]     Order Status:  Canceled Specimen:  Body Fluid from Sputum, Expectorated     Respiratory Infection Panel, Nasopharyngeal [[id number]] Collected:  11/12/19 1226    Order Status:  Completed Specimen:  Nasopharyngeal Swab Updated:  11/12/19 2025     Respiratory Infection Panel Source NP Swab     Adenovirus Not Detected     Coronavirus 229E Not Detected     Coronavirus HKU1 Not Detected     Coronavirus NL63 Not Detected     Coronavirus OC43 Not Detected     Human Metapneumovirus Not Detected     Human Rhinovirus/Enterovirus Not Detected     Influenza A (subtypes H1, H1-2009,H3) Not Detected     Influenza B Not Detected     Parainfluenza Virus 1 Not Detected     Parainfluenza Virus 2 Not Detected     Parainfluenza Virus 3 Not Detected     Parainfluenza Virus 4 Not Detected     Respiratory Syncytial Virus Not Detected     Bordetella Parapertussis (GQ4919) Not Detected     Bordetella pertussis (ptxP) Not Detected     Chlamydia pneumoniae Not Detected     Mycoplasma pneumoniae Not Detected    Narrative:       For all other respiratory sources order [id number] Respiratory  Viral Panel by PCR (RVPCR)    AFB Culture & Smear [[id number]] Collected:  11/12/19 1007    Order Status:  Canceled Specimen:  Respiratory from Sputum, Expectorated     AFB Culture & Smear [[id number]]     Order Status:  Canceled Specimen:  Respiratory from Sputum, Expectorated     Culture, Respiratory with Gram Stain [[id number]]     Order Status:  Canceled Specimen:  Respiratory from Sputum

## 2019-11-16 NOTE — CARE UPDATE
Rapid Response Respiratory Therapy Proactive Rounding Note      Time of visit: 1638    Code Status: Full Code   : 1975  Age: 44 y.o.  Weight:   Wt Readings from Last 1 Encounters:   19 (P) 63 kg (138 lb 14.2 oz)     Sex: male  Race: White   Bed: 5839/1675 A:   MRN: [id number]    SITUATION     Evaluated patient for: elevated mews score    BACKGROUND     Patient has no past medical history on file.    ASSESSMENT/INTERVENTIONS     Upon arrival in room pt found in bed moaning. Pt states he's in pain. I checked the patients vital signs and he was 130/30/85% on room air. Placed pt back on 5L NC and all vitals improved. Pt also coughed up a large amount of mucus.    Pulse: 130/112 after oxygen placed Respiratory rate: 30/22 after oxygen placed Temperature: Temp: 98.7 °F (37.1 °C) BP: BP: 112/61 SpO2: 85%/92% after oxygen placed  Level of Consciousness: Level of Consciousness (AVPU): alert  Respiratory Effort: Respiratory Effort: Unlabored Expansion/Accessory Muscle Usage: Expansion/Accessory Muscles/Retractions: expansion symmetric, no retractions, no use of accessory muscles  All Lung Field Breath Sounds: All Lung Fields Breath Sounds: Anterior:  CECILIA Breath Sounds: coarse  LLL Breath Sounds: coarse  RUL Breath Sounds: coarse  RML Breath Sounds: coarse  RLL Breath Sounds: coarse  Mobility at time of assessment: General Mobility: generalized weakness  O2 Device/Concentration: NC/5L  Most recent blood gas: No results for input(s): PH, PCO2, PO2, HCO3, POCSATURATED, BE in the last 72 hours.  P/F Ratio:    NIPPV: No Surgical airway: No  ETCO2 monitored:    Ambu at bedside: Ambu bag with the patient?: Yes, Adult Ambu    Current Respiratory Care Orders:     Sputum Induction Once Once     Comments: For specimen collection    19 1110   19 1111  Incentive spirometry Every 2 hours while awake (7 of 34 released)    Release    19 1110   11/15/19 0335  Inhalation Treatment Once Once      11/15/19  0334   11/14/19 1600  ACAPELLA TREATMENT Q4H Every 4 hours (15 of 38 released)    Release    11/14/19 1345   11/14/19 1346  Inhalation Treatment Q4H Every 4 hours (15 of 38 released)    Release    11/14/19 1345   11/12/19 2000  Pulse Oximetry Q4H Every 4 hours (26 of 55 released)    Release    11/12/19 1630   Unscheduled  Oxygen PRN Use PRN (0 of 86051 released)    Release   References: Oxygen Titration Protocol   Question Answer Comment   Device type: Low flow    Device: Nasal Cannula (1- 5 Liters)    LPM: 1 - 2    Titrate O2 per Oxygen Titration Protocol: Yes    To maintain SpO2 goal of: >= 88%    Notify MD of: Inability to achieve desired SpO2; Sudden change in patient status and requires 20% increase in FiO2; Patient requires >60% FiO2        11/15/19 1743      IP Meds - Nasal and Inhaled   Comment  Hide   (From admission, onward)      Last edited by  on  at    Start   Stop Status Route Frequency Ordered   11/14/19 1345  albuterol-ipratropium 2.5 mg-0.5 mg/3 mL nebulizer solution 3 mL (albuterol-ipratropium (DUO-NEB) 0.5 - 3 mg (2.5 mg base)/ 3 mL nebulizer panel)    Discontinue    -- Dispensed NEBULIZATION Every 4 hours 11/14/19 1343   Orders     All Active Orders   Other Signed and Held Orders     None       Tobacco History     Tobacco Use     Current Every Day Smoker; Smokes 1 pack/day; Smoked: Cigarettes.   Medical Problems   Comment      Last edited by  on  at    Hospital Problem List   Date Reviewed: 11/12/2019      Codes Priority Class Noted POA    Septic shock ICD-10-CM: A41.9, R65.21  ICD-9-CM: 038.9, 785.52, 995.92   11/12/2019 Yes   Leukocytosis ICD-10-CM: D72.829  ICD-9-CM: 288.60   11/12/2019 Yes   Left upper lobe pneumonia ICD-10-CM: J18.1  ICD-9-CM: 486   11/13/2019 Yes   Elevated liver enzymes ICD-10-CM: R74.8  ICD-9-CM: 790.5   11/13/2019 Yes   Pleurisy ICD-10-CM: R09.1  ICD-9-CM: 511.0   11/14/2019 Yes   Hepatitis C antibody positive in blood ICD-10-CM: R76.8  ICD-9-CM: 795.79   11/14/2019 Yes    Tobacco abuse ICD-10-CM: Z72.0  ICD-9-CM: 305.1   11/14/2019 Yes      Non-Hospital Problem List   Date Reviewed: 11/12/2019      Codes Priority Class Noted   Infected wound ICD-10-CM: T14.8XXA, L08.9  ICD-9-CM: 958.3   10/15/2016      Respiratory   Report   Lab Data   (Last 24 hours)   None      O2/Vent Data   (Last 4)     11/16 1145 11/16 1523 11/16 1618 11/16 1638   SpO2 (%) 90 97 95 85   O2 Device (Oxygen Therapy) nasal c...  room air room air      Mobility      Most Recent Value   RASS ([place] Agitation-Sedation Scale) 0-->alert and calm filed at 11/16/2019 0651   [place] Coma Scale Score 15 filed at 11/16/2019 0651   Quick View     Patient Care Snapshot   Comprehensive Flowsheet   ED Encounter Summary   ED Patient Care Timeline   Medical, Surgical, Social, and Family History   Comprehensive Care Plan   Comprehensive Patient Education   Restraints   Discharge   Code Summary (for printing)   Blood Transfusion   Vitals     Last Day's Vitals   Vitals Graph   Weights    Medications     Current Meds   Medication History   Anti-coagulation Dosing   Fever/antibiotic Dosing   Glucose Monitoring   Pain Monitoring   Results     Labs - Last 72 Hours   Labs - Entire Admission   Microbiology Results   Radiology Results    Print     Full Transfer Printout   Facesheet   Ticket to Ride   Home Health Orders   Ambulance Transport   Hemodialysis Report   Additional Reports     View-Only Flowsheet Data   Code/Rapid Response Timeline   Patient Calendar   Treatment Team Audit   LDA Documentation   LDA List               RECOMMENDATIONS     We recommend: continue plan of care, maybe start CPT for mucus    ESCALATION      Physician Escalation (Yes/No) no    Discussed plan of care primary RT, Abida     FOLLOW-UP     Please call back the Rapid Response RTLuz RT at x [phone] for any questions or concerns.

## 2019-11-17 PROBLEM — F19.10 POLYSUBSTANCE ABUSE: Status: ACTIVE | Noted: 2019-11-17

## 2019-11-17 PROBLEM — J96.01 ACUTE HYPOXEMIC RESPIRATORY FAILURE: Status: ACTIVE | Noted: 2019-11-17

## 2019-11-17 LAB
ALBUMIN SERPL BCP-MCNC: 1.4 G/DL (ref 3.5–5.2)
ALP SERPL-CCNC: 127 U/L (ref 55–135)
ALT SERPL W/O P-5'-P-CCNC: 66 U/L (ref 10–44)
ANION GAP SERPL CALC-SCNC: 8 MMOL/L (ref 8–16)
AST SERPL-CCNC: 45 U/L (ref 10–40)
BACTERIA BLD CULT: NORMAL
BASOPHILS # BLD AUTO: 0.05 K/UL (ref 0–0.2)
BASOPHILS NFR BLD: 0.4 % (ref 0–1.9)
BILIRUB SERPL-MCNC: 0.2 MG/DL (ref 0.1–1)
BUN SERPL-MCNC: 5 MG/DL (ref 6–20)
CALCIUM SERPL-MCNC: 8 MG/DL (ref 8.7–10.5)
CHLORIDE SERPL-SCNC: 98 MMOL/L (ref 95–110)
CO2 SERPL-SCNC: 24 MMOL/L (ref 23–29)
CREAT SERPL-MCNC: 0.6 MG/DL (ref 0.5–1.4)
DIFFERENTIAL METHOD: ABNORMAL
EOSINOPHIL # BLD AUTO: 0.2 K/UL (ref 0–0.5)
EOSINOPHIL NFR BLD: 1.1 % (ref 0–8)
ERYTHROCYTE [DISTWIDTH] IN BLOOD BY AUTOMATED COUNT: 14.7 % (ref 11.5–14.5)
EST. GFR  (AFRICAN AMERICAN): >60 ML/MIN/1.73 M^2
EST. GFR  (NON AFRICAN AMERICAN): >60 ML/MIN/1.73 M^2
GLUCOSE SERPL-MCNC: 118 MG/DL (ref 70–110)
HCT VFR BLD AUTO: 29.5 % (ref 40–54)
HGB BLD-MCNC: 9.2 G/DL (ref 14–18)
IMM GRANULOCYTES # BLD AUTO: 0.2 K/UL (ref 0–0.04)
IMM GRANULOCYTES NFR BLD AUTO: 1.5 % (ref 0–0.5)
LYMPHOCYTES # BLD AUTO: 1.4 K/UL (ref 1–4.8)
LYMPHOCYTES NFR BLD: 10.1 % (ref 18–48)
MAGNESIUM SERPL-MCNC: 1.9 MG/DL (ref 1.6–2.6)
MCH RBC QN AUTO: 27.5 PG (ref 27–31)
MCHC RBC AUTO-ENTMCNC: 31.2 G/DL (ref 32–36)
MCV RBC AUTO: 88 FL (ref 82–98)
MONOCYTES # BLD AUTO: 1.2 K/UL (ref 0.3–1)
MONOCYTES NFR BLD: 9.3 % (ref 4–15)
NEUTROPHILS # BLD AUTO: 10.4 K/UL (ref 1.8–7.7)
NEUTROPHILS NFR BLD: 77.6 % (ref 38–73)
NRBC BLD-RTO: 0 /100 WBC
PHOSPHATE SERPL-MCNC: 3.7 MG/DL (ref 2.7–4.5)
PLATELET # BLD AUTO: 674 K/UL (ref 150–350)
PMV BLD AUTO: 9.5 FL (ref 9.2–12.9)
POTASSIUM SERPL-SCNC: 3.5 MMOL/L (ref 3.5–5.1)
PROT SERPL-MCNC: 5.8 G/DL (ref 6–8.4)
RBC # BLD AUTO: 3.34 M/UL (ref 4.6–6.2)
SODIUM SERPL-SCNC: 130 MMOL/L (ref 136–145)
VANCOMYCIN TROUGH SERPL-MCNC: 11.7 UG/ML (ref 10–22)
WBC # BLD AUTO: 13.39 K/UL (ref 3.9–12.7)

## 2019-11-17 PROCEDURE — 80202 ASSAY OF VANCOMYCIN: CPT

## 2019-11-17 PROCEDURE — 94664 DEMO&/EVAL PT USE INHALER: CPT

## 2019-11-17 PROCEDURE — 83735 ASSAY OF MAGNESIUM: CPT

## 2019-11-17 PROCEDURE — 36415 COLL VENOUS BLD VENIPUNCTURE: CPT

## 2019-11-17 PROCEDURE — 99232 PR SUBSEQUENT HOSPITAL CARE,LEVL II: ICD-10-PCS | Mod: ,,, | Performed by: INTERNAL MEDICINE

## 2019-11-17 PROCEDURE — 99232 SBSQ HOSP IP/OBS MODERATE 35: CPT | Mod: ,,, | Performed by: INTERNAL MEDICINE

## 2019-11-17 PROCEDURE — 94761 N-INVAS EAR/PLS OXIMETRY MLT: CPT

## 2019-11-17 PROCEDURE — 94640 AIRWAY INHALATION TREATMENT: CPT

## 2019-11-17 PROCEDURE — 63600175 PHARM REV CODE 636 W HCPCS: Performed by: INTERNAL MEDICINE

## 2019-11-17 PROCEDURE — 25000242 PHARM REV CODE 250 ALT 637 W/ HCPCS: Performed by: INTERNAL MEDICINE

## 2019-11-17 PROCEDURE — 84100 ASSAY OF PHOSPHORUS: CPT

## 2019-11-17 PROCEDURE — 20600001 HC STEP DOWN PRIVATE ROOM

## 2019-11-17 PROCEDURE — 25000003 PHARM REV CODE 250: Performed by: INTERNAL MEDICINE

## 2019-11-17 PROCEDURE — 99900035 HC TECH TIME PER 15 MIN (STAT)

## 2019-11-17 PROCEDURE — 27000221 HC OXYGEN, UP TO 24 HOURS

## 2019-11-17 PROCEDURE — 85025 COMPLETE CBC W/AUTO DIFF WBC: CPT

## 2019-11-17 PROCEDURE — 80053 COMPREHEN METABOLIC PANEL: CPT

## 2019-11-17 RX ADMIN — OXYCODONE HYDROCHLORIDE 5 MG: 5 TABLET ORAL at 06:11

## 2019-11-17 RX ADMIN — OXYCODONE HYDROCHLORIDE 5 MG: 5 TABLET ORAL at 12:11

## 2019-11-17 RX ADMIN — VANCOMYCIN HYDROCHLORIDE 1500 MG: 1.5 INJECTION, POWDER, LYOPHILIZED, FOR SOLUTION INTRAVENOUS at 06:11

## 2019-11-17 RX ADMIN — IPRATROPIUM BROMIDE AND ALBUTEROL SULFATE 3 ML: .5; 3 SOLUTION RESPIRATORY (INHALATION) at 05:11

## 2019-11-17 RX ADMIN — VANCOMYCIN HYDROCHLORIDE 1250 MG: 1.25 INJECTION, POWDER, LYOPHILIZED, FOR SOLUTION INTRAVENOUS at 02:11

## 2019-11-17 RX ADMIN — MAGNESIUM OXIDE TAB 400 MG (241.3 MG ELEMENTAL MG) 400 MG: 400 (241.3 MG) TAB at 08:11

## 2019-11-17 RX ADMIN — GUAIFENESIN AND DEXTROMETHORPHAN HYDROBROMIDE 2 TABLET: 600; 30 TABLET, EXTENDED RELEASE ORAL at 08:11

## 2019-11-17 RX ADMIN — IPRATROPIUM BROMIDE AND ALBUTEROL SULFATE 3 ML: .5; 3 SOLUTION RESPIRATORY (INHALATION) at 09:11

## 2019-11-17 RX ADMIN — CEFTRIAXONE 2 G: 2 INJECTION, SOLUTION INTRAVENOUS at 08:11

## 2019-11-17 RX ADMIN — ENOXAPARIN SODIUM 40 MG: 100 INJECTION SUBCUTANEOUS at 05:11

## 2019-11-17 RX ADMIN — RAMELTEON 8 MG: 8 TABLET ORAL at 08:11

## 2019-11-17 RX ADMIN — OXYCODONE HYDROCHLORIDE 5 MG: 5 TABLET ORAL at 04:11

## 2019-11-17 RX ADMIN — VANCOMYCIN HYDROCHLORIDE 1250 MG: 1.25 INJECTION, POWDER, LYOPHILIZED, FOR SOLUTION INTRAVENOUS at 11:11

## 2019-11-17 RX ADMIN — IPRATROPIUM BROMIDE AND ALBUTEROL SULFATE 3 ML: .5; 3 SOLUTION RESPIRATORY (INHALATION) at 12:11

## 2019-11-17 NOTE — PLAN OF CARE
Pt resting in bed, 0-3LNC throughout the day, VSS, pt SR-ST on telemetry monitoring, Sa02 >90, pt with productive cough, IS instruction provided, pt minimally compliant due to pain with coughing, sputum sent to lab, airborn isolation continued, PRN pain medication Q6 per MAR, pt voiding without difficulty, no visitors today and pt has been compliant with bedrest orders except to ambulate to bathroom. IV antibiotics continued and POC reviewed, pt denies further needs at this time.

## 2019-11-17 NOTE — PROGRESS NOTES
Pharmacokinetic Assessment Follow Up: IV Vancomycin    Vancomycin serum concentration assessment(s):    The trough level was drawn correctly and can be used to guide therapy at this time. The measurement is below the desired definitive target range of 15 to 20 mcg/mL.     -- Trough resulted at 11.7 on the regimen of 1250 mg q 8 hours    Vancomycin Regimen Plan:    Change regimen to Vancomycin 1500 mg IV every 8 hours with next serum trough concentration measured at 1830 prior to 4th dose on 11/18.    Drug levels (last 3 results):  Recent Labs   Lab Result Units 11/15/19  1157 11/16/19  0332 11/17/19  1016   Vancomycin-Trough ug/mL 5.2* 8.8*  8.7* 11.7       Pharmacy will continue to follow and monitor vancomycin.    Please contact pharmacy at extension [phone] for questions regarding this assessment.    Thank you for the consult,     Linda Moss, PharmD  PGY-2 Pharmacy Resident- Internal Medicine  EXT [phone]       Patient brief summary:  Hakeem Hunt is a 44 y.o. male initiated on antimicrobial therapy with IV Vancomycin for treatment of lower respiratory infection    The patient's current regimen is 1250 mg q 8 h    Drug Allergies:   Review of patient's allergies indicates:   Allergen Reactions    Pcn [penicillins] Rash       Actual Body Weight:   62 kg    Renal Function:   Estimated Creatinine Clearance: 138.6 mL/min (based on SCr of 0.6 mg/dL).,     Dialysis Method (if applicable):  N/A    CBC (last 72 hours):  Recent Labs   Lab Result Units 11/14/19  2309 11/15/19  0426 11/16/19  0332 11/17/19  0440   WBC K/uL 16.12* 19.15* 14.17* 13.39*   Hemoglobin g/dL 10.0* 10.4* 9.2* 9.2*   Hematocrit % 29.9* 31.8* 28.0* 29.5*   Platelets K/uL 501* 498* 590* 674*   Gran% % 75.3* 80.4* 72.6 77.6*   Lymph% % 13.1* 8.1* 13.8* 10.1*   Mono% % 10.0 9.7 10.8 9.3   Eosinophil% % 0.4 0.2 1.1 1.1   Basophil% % 0.3 0.3 0.4 0.4   Differential Method  Automated Automated Automated Automated       Metabolic Panel (last 72  hours):  Recent Labs   Lab Result Units 11/14/19  2309 11/15/19  0426 11/16/19  0332 11/16/19  1940 11/17/19  0439   Sodium mmol/L 132* 129* 132*  --  130*   Potassium mmol/L 3.6 4.1 3.9  --  3.5   Chloride mmol/L 100 98 100  --  98   CO2 mmol/L 25 25 24  --  24   Glucose mg/dL 96 103 99  --  118*   BUN, Bld mg/dL 7 5* 6  --  5*   Creatinine mg/dL 0.6 0.6 0.6  --  0.6   Creatinine, Random Ur mg/dL  --   --   --  67.0  --    Albumin g/dL  --  1.5* 1.4*  --  1.4*   Total Bilirubin mg/dL  --  0.5 0.4  --  0.2   Alkaline Phosphatase U/L  --  175* 145*  --  127   AST U/L  --  49* 29  --  45*   ALT U/L  --  102* 71*  --  66*   Magnesium mg/dL 1.8 1.9 2.0  --  1.9   Phosphorus mg/dL 3.1 2.5* 4.0  --  3.7       Vancomycin Administrations:  vancomycin given in the last 96 hours                   vancomycin in dextrose 5 % 1 gram/250 mL IVPB 1,000 mg (mg) 1,000 mg New Bag 11/16/19 0354     1,000 mg New Bag 11/15/19 2002      Restarted  1130     1,000 mg New Bag  1032     1,000 mg New Bag  0316    vancomycin (VANCOCIN) 500 mg in dextrose 5 % 100 mL IVPB (mg) 500 mg New Bag 11/14/19 2043    vancomycin in dextrose 5 % 1 gram/250 mL IVPB 1,000 mg (mg) 1,000 mg New Bag 11/14/19 1849     1,000 mg New Bag  0406     1,000 mg New Bag 11/13/19 1619     1,000 mg New Bag 11/12/19 2318     1,000 mg New Bag  1105                Microbiologic Results:  Microbiology Results (last 7 days)     Procedure Component Value Units Date/Time    Culture, Respiratory with Gram Stain [[id number]] Collected:  11/16/19 1157    Order Status:  Completed Specimen:  Respiratory from Sputum Updated:  11/17/19 3365     Respiratory Culture Normal respiratory magdalena     Gram Stain (Respiratory) <10 epithelial cells per low power field.     Gram Stain (Respiratory) Few WBC's     Gram Stain (Respiratory) Few Gram positive cocci     Gram Stain (Respiratory) Rare Gram positive cocci    Blood culture [[id number]] Collected:  11/12/19 3112    Order Status:  Completed  Specimen:  Blood from Peripheral, Hand, Left Updated:  11/17/19 0622     Blood Culture, Routine No growth after 5 days.    Blood culture [[id number]] Collected:  11/14/19 2309    Order Status:  Completed Specimen:  Blood Updated:  11/17/19 0612     Blood Culture, Routine No Growth to date      No Growth to date      No Growth to date    Blood culture [[id number]] Collected:  11/14/19 2309    Order Status:  Completed Specimen:  Blood Updated:  11/17/19 0612     Blood Culture, Routine No Growth to date      No Growth to date      No Growth to date    Blood Culture #2 **CANNOT BE ORDERED STAT** [[id number]] Collected:  11/11/19 2240    Order Status:  Completed Specimen:  Blood from Peripheral, Forearm, Right Updated:  11/17/19 0612     Blood Culture, Routine No growth after 5 days.    Blood Culture #1 **CANNOT BE ORDERED STAT** [[id number]] Collected:  11/11/19 2225    Order Status:  Completed Specimen:  Blood from Peripheral, Upper Arm, Right Updated:  11/17/19 0612     Blood Culture, Routine No growth after 5 days.    AFB Culture & Smear [[id number]] Collected:  11/16/19 1158    Order Status:  Sent Specimen:  Respiratory from Sputum, Expectorated Updated:  11/16/19 1208    Culture, Respiratory with Gram Stain [[id number]] Collected:  11/13/19 1817    Order Status:  Sent Specimen:  Respiratory from Sputum Updated:  11/16/19 1015    AFB Culture & Smear [[id number]] Collected:  11/14/19 2000    Order Status:  Completed Specimen:  Respiratory from Sputum, Expectorated Updated:  11/16/19 0927     AFB Culture & Smear Culture in progress     AFB CULTURE STAIN No acid fast bacilli seen.    AFB Culture & Smear [[id number]]     Order Status:  Canceled Specimen:  Respiratory from Sputum, Expectorated     AFB Culture & Smear [[id number]]     Order Status:  Canceled Specimen:  Respiratory from Sputum, Expectorated     AFB Culture & Smear [[id number]]     Order Status:  Canceled Specimen:  Respiratory from Sputum, Expectorated     AFB  Culture & Smear [[id number]]     Order Status:  Canceled Specimen:  Respiratory from Sputum, Expectorated     AFB Culture & Smear [[id number]]     Order Status:  Canceled Specimen:  Respiratory from Sputum, Expectorated     AFB Culture & Smear [[id number]]     Order Status:  Canceled Specimen:  Respiratory from Sputum, Expectorated     AFB Culture & Smear [[id number]] Collected:  11/12/19 1007    Order Status:  Completed Specimen:  Respiratory from Sputum, Expectorated Updated:  11/13/19 2127     AFB Culture & Smear Culture in progress     AFB CULTURE STAIN No acid fast bacilli seen.    Respiratory Viral Panel by PCR [facility]; Sputum [[id number]] Collected:  11/13/19 1648    Order Status:  Sent Specimen:  Respiratory Updated:  11/13/19 1811    AFB Culture & Smear [[id number]]     Order Status:  Canceled Specimen:  Body Fluid from Sputum, Expectorated     Respiratory Infection Panel, Nasopharyngeal [[id number]] Collected:  11/12/19 1226    Order Status:  Completed Specimen:  Nasopharyngeal Swab Updated:  11/12/19 2025     Respiratory Infection Panel Source NP Swab     Adenovirus Not Detected     Coronavirus 229E Not Detected     Coronavirus HKU1 Not Detected     Coronavirus NL63 Not Detected     Coronavirus OC43 Not Detected     Human Metapneumovirus Not Detected     Human Rhinovirus/Enterovirus Not Detected     Influenza A (subtypes H1, H1-2009,H3) Not Detected     Influenza B Not Detected     Parainfluenza Virus 1 Not Detected     Parainfluenza Virus 2 Not Detected     Parainfluenza Virus 3 Not Detected     Parainfluenza Virus 4 Not Detected     Respiratory Syncytial Virus Not Detected     Bordetella Parapertussis (YD3171) Not Detected     Bordetella pertussis (ptxP) Not Detected     Chlamydia pneumoniae Not Detected     Mycoplasma pneumoniae Not Detected    Narrative:       For all other respiratory sources order [id number] Respiratory  Viral Panel by PCR (RVPCR)    AFB Culture & Smear [[id number]] Collected:  11/12/19 1007     Order Status:  Canceled Specimen:  Respiratory from Sputum, Expectorated     AFB Culture & Smear [[id number]]     Order Status:  Canceled Specimen:  Respiratory from Sputum, Expectorated     Culture, Respiratory with Gram Stain [[id number]]     Order Status:  Canceled Specimen:  Respiratory from Sputum

## 2019-11-17 NOTE — PROGRESS NOTES
[facility] Hospital Medicine  Progress Note    Patient Name: Hakeem Hunt  MRN: [id number]  Patient Class: IP- Inpatient   Admission Date: 11/11/2019  Length of Stay: 5 days  Attending Physician: Lorrie Vick MD  Primary Care Provider: Spencer Rivera MD    [facility] Medicine Team: [facility] MED D Lorrie Vick MD    Chief Complaint   Patient presents with    Chest Pain     Pt c/o productive cough, SOB, congestion, chest pain hurting worse with deep breathing/coughing and fevers since last week. Last ibuprofen at 1800 tonight. +smoker. Denies chest pain.      HPI: 44 y.o. male with no medical history presents to the ED complaining fever and flu-like symptoms for the past week.  + chills, cough productive of bloody sputum, shortness of breath, sore throat, dark-colored urine, unintentional weight loss, headache, lightheadedness, chest pain that is worse with coughing. No recent travels or known sick contacts no flu vax. He smokes daily, socially drinks alcohol, recreationally uses drugs, last IVDU 2 weeks ago.    Subjective:     Principal Problem:Septic shock    Interval History:  Having pleuritic chest pain with cough. Persistent hypoxia.  Patient repeatedly removes his oxygen via NC. Drug screen noted to be positive for amphetamines.    Review of Systems   Constitutional: Negative for fever.   Respiratory: Positive for cough and chest tightness. Negative for shortness of breath.      Objective:     Vital Signs (Most Recent):  Temp: 98.3 °F (36.8 °C) (11/17/19 1534)  Pulse: 108 (11/17/19 1534)  Resp: 18 (11/17/19 1534)  BP: (!) 109/56 (11/17/19 1534)  SpO2: 97 % (11/17/19 1534) Vital Signs (24h Range):  Temp:  [97.4 °F (36.3 °C)-99.6 °F (37.6 °C)] 98.3 °F (36.8 °C)  Pulse:  [] 108  Resp:  [18-30] 18  SpO2:  [85 %-97 %] 97 %  BP: ()/(54-66) 109/56     Weight: 61.1 kg (134 lb 11.2 oz)  Body mass index is 22.42 kg/m².    Intake/Output Summary (Last 24 hours) at  11/17/2019 1628  Last data filed at 11/17/2019 0600  Gross per 24 hour   Intake 1100 ml   Output 1700 ml   Net -600 ml      Physical Exam   Constitutional: No distress.   Eyes: Conjunctivae and lids are normal.   Cardiovascular: S1 normal and S2 normal.   Pulmonary/Chest: Effort normal. He has decreased breath sounds. He has rhonchi in the left upper field.   Abdominal: Soft. Bowel sounds are normal.   Musculoskeletal: He exhibits no edema.   Neurological: He is alert. He is not disoriented.     Significant Labs:   CBC:   Recent Labs   Lab 11/16/19 0332 11/17/19 0440   WBC 14.17* 13.39*   HGB 9.2* 9.2*   HCT 28.0* 29.5*   * 674*     CMP:   Recent Labs   Lab 11/16/19 0332 11/17/19 0439   * 130*   K 3.9 3.5    98   CO2 24 24   GLU 99 118*   BUN 6 5*   CREATININE 0.6 0.6   CALCIUM 7.9* 8.0*   PROT 5.9* 5.8*   ALBUMIN 1.4* 1.4*   BILITOT 0.4 0.2   ALKPHOS 145* 127   AST 29 45*   ALT 71* 66*   ANIONGAP 8 8   EGFRNONAA >60.0 >60.0     Magnesium:   Recent Labs   Lab 11/16/19 0332 11/17/19 0439   MG 2.0 1.9       Significant Imaging: .    Assessment/Plan:      Active Diagnoses:    Diagnosis Date Noted POA    PRINCIPAL PROBLEM:  Septic shock [A41.9, R65.21] 11/12/2019 Yes    Acute hypoxemic respiratory failure [J96.01] 11/17/2019 Yes    Polysubstance abuse [F19.10] 11/17/2019 Yes    Pleurisy [R09.1] 11/14/2019 Yes    Hepatitis C antibody positive in blood [R76.8] 11/14/2019 Yes    Tobacco abuse [Z72.0] 11/14/2019 Yes    Left upper lobe pneumonia [J18.1] 11/13/2019 Yes    Elevated liver enzymes [R74.8] 11/13/2019 Yes    Leukocytosis [D72.829] 11/12/2019 Yes      Problems Resolved During this Admission:     Overview / Hospital Course:   Patient admitted for sepsis likely due to to pneumonia. TTE negative, no vegetations. BCx with NGTD. Quant gold, AFB culture/smear, resp panel, and  Legionella labs - all pending at transfer. Patient was weaned off levophed at 5AM on 11/12. Continued on broad  spectrum antibiotics and transferred to Hospital Medicine for further care on 11/13.     Inpatient Medications prescribed for management of Current Problems:   Scheduled Meds:    albuterol-ipratropium  3 mL Nebulization Q4H    cefTRIAXone (ROCEPHIN) IVPB  2 g Intravenous Q24H    dextromethorphan-guaifenesin  mg  2 tablet Oral BID    enoxaparin  40 mg Subcutaneous Daily    magnesium oxide  400 mg Oral Daily    vancomycin (VANCOCIN) IVPB  1,500 mg Intravenous Q8H     Continuous Infusions:     As Needed: acetaminophen, benzonatate, calcium carbonate, ondansetron, ondansetron, oxyCODONE, ramelteon, sodium chloride 0.9%, sodium chloride 0.9%    Assessment and Plan by Problem     Septic shock  Left upper lobe pneumonia  Shock ikely due to pneumonia. CXR with diffuse dense opacity of the left upper lobe  - Other possible etiologies include: TB, malignancy, infective endocarditis given his hx of IVDU   - Received vancomycin, Zosyn, azithromycin in ED which were continued  - Received 30cc/kg IVF in ED  - Required levophed for pressure support; weaned as tolerated.  - Antibiotics changed to cefepime, vancomycin, and azithromycin started. Recent weight loss, night sweats, fever for the last week.  He is homeless and in and out of care home. Rule out TB.  - F/U Quantiferon test and collect AFB cultures  - F/u Blood cx x3, and respiratory panel  - TT Echo without vegetations  - UA negative for infection  - HIV negative  - Influenza negative  - Awaiting final results of AFB smears x 3; respiratory sputum cultures and viral panel were not received by laboratory. De-escalated cephalosporin 11/15.  - QuantiFERON gold for TB and Legionella negative.   - CXR shows worsening consolidation 11/16. Worsening hypoxia.   - WBC down-trending and patient afebrile. Sputum culture with NF, discontinue vancomycin.   - Awaiting 3rd AFB smear. Wean O2 - goal SpO2 > 88%     Leukocytosis       Persistent but down-trending     Elevated  liver enzymes       Acute hepatitis panel reveals positive Hep C Ab.       Refer to Hep C Clinic.  Hepatitis C PCR pending    Diet Adult Regular (IDDSI Level 7) [facility]; Fluid - 1500mL    Significant LDAs:     HIGH RISK CONDITION(S):   Patient is currently on drug therapy requiring intensive monitoring for toxicity: Vancomycin     Discharge plan and follow up  Home or Self Care    VTE Risk Mitigation (From admission, onward)         Ordered     enoxaparin injection 40 mg  Daily      11/12/19 0302     IP VTE HIGH RISK PATIENT  Once      11/12/19 0302                Lorrie Vick MD  Department of Hospital Medicine   [facility]

## 2019-11-17 NOTE — PLAN OF CARE
Problem: Adult Inpatient Plan of Care  Goal: Plan of Care Review  Description     Outcome: Ongoing, Progressing  Flowsheets (Taken 11/17/2019 0607)  Plan of Care Reviewed With: patient  Goal: Absence of Hospital-Acquired Illness or Injury  Outcome: Ongoing, Progressing     Problem: Infection  Goal: Infection Symptom Resolution  Outcome: Ongoing, Progressing     Problem: Fall Injury Risk  Goal: Absence of Fall and Fall-Related Injury  Outcome: Ongoing, Progressing   Plan of care reviewed with pt. Pt voiced understanding. Pt AAO X 4. Pt c/o chest pain that radiates to back from coughing so much during the shift. Pt was found several times with O2 off, and pt was reminded to wear O2. No apparent distress noted. Fall precautions maintained. Pt remains free of fall or injury. Bed in lowest position, locked, call light within reach, and bed alarm on. Side rails up x's 2 with slip resistant socks on.

## 2019-11-18 VITALS
HEIGHT: 65 IN | WEIGHT: 134.69 LBS | DIASTOLIC BLOOD PRESSURE: 54 MMHG | TEMPERATURE: 99 F | RESPIRATION RATE: 17 BRPM | BODY MASS INDEX: 22.44 KG/M2 | OXYGEN SATURATION: 94 % | SYSTOLIC BLOOD PRESSURE: 96 MMHG | HEART RATE: 112 BPM

## 2019-11-18 PROBLEM — A41.9 SEPTIC SHOCK: Status: RESOLVED | Noted: 2019-11-12 | Resolved: 2019-11-18

## 2019-11-18 PROBLEM — J96.01 ACUTE HYPOXEMIC RESPIRATORY FAILURE: Status: RESOLVED | Noted: 2019-11-17 | Resolved: 2019-11-18

## 2019-11-18 PROBLEM — D72.829 LEUKOCYTOSIS: Status: RESOLVED | Noted: 2019-11-12 | Resolved: 2019-11-18

## 2019-11-18 PROBLEM — R65.21 SEPTIC SHOCK: Status: RESOLVED | Noted: 2019-11-12 | Resolved: 2019-11-18

## 2019-11-18 LAB
ALBUMIN SERPL BCP-MCNC: 1.5 G/DL (ref 3.5–5.2)
ALP SERPL-CCNC: 119 U/L (ref 55–135)
ALT SERPL W/O P-5'-P-CCNC: 80 U/L (ref 10–44)
ANION GAP SERPL CALC-SCNC: 9 MMOL/L (ref 8–16)
AST SERPL-CCNC: 64 U/L (ref 10–40)
BACTERIA SPEC AEROBE CULT: NORMAL
BACTERIA SPEC AEROBE CULT: NORMAL
BASOPHILS # BLD AUTO: 0.08 K/UL (ref 0–0.2)
BASOPHILS NFR BLD: 0.6 % (ref 0–1.9)
BILIRUB SERPL-MCNC: 0.2 MG/DL (ref 0.1–1)
BUN SERPL-MCNC: 6 MG/DL (ref 6–20)
CALCIUM SERPL-MCNC: 7.9 MG/DL (ref 8.7–10.5)
CHLORIDE SERPL-SCNC: 99 MMOL/L (ref 95–110)
CO2 SERPL-SCNC: 22 MMOL/L (ref 23–29)
CREAT SERPL-MCNC: 0.6 MG/DL (ref 0.5–1.4)
DIFFERENTIAL METHOD: ABNORMAL
EOSINOPHIL # BLD AUTO: 0.1 K/UL (ref 0–0.5)
EOSINOPHIL NFR BLD: 0.8 % (ref 0–8)
ERYTHROCYTE [DISTWIDTH] IN BLOOD BY AUTOMATED COUNT: 14.7 % (ref 11.5–14.5)
EST. GFR  (AFRICAN AMERICAN): >60 ML/MIN/1.73 M^2
EST. GFR  (NON AFRICAN AMERICAN): >60 ML/MIN/1.73 M^2
GLUCOSE SERPL-MCNC: 103 MG/DL (ref 70–110)
GRAM STN SPEC: NORMAL
HCT VFR BLD AUTO: 31.6 % (ref 40–54)
HCV RNA SERPL NAA+PROBE-LOG IU: <1.08 LOG (10) IU/ML
HCV RNA SERPL QL NAA+PROBE: NOT DETECTED IU/ML
HCV RNA SPEC NAA+PROBE-ACNC: <12 IU/ML
HGB BLD-MCNC: 10.2 G/DL (ref 14–18)
IMM GRANULOCYTES # BLD AUTO: 0.2 K/UL (ref 0–0.04)
IMM GRANULOCYTES NFR BLD AUTO: 1.6 % (ref 0–0.5)
LYMPHOCYTES # BLD AUTO: 2.1 K/UL (ref 1–4.8)
LYMPHOCYTES NFR BLD: 17 % (ref 18–48)
MAGNESIUM SERPL-MCNC: 2 MG/DL (ref 1.6–2.6)
MCH RBC QN AUTO: 28.3 PG (ref 27–31)
MCHC RBC AUTO-ENTMCNC: 32.3 G/DL (ref 32–36)
MCV RBC AUTO: 88 FL (ref 82–98)
MONOCYTES # BLD AUTO: 1.4 K/UL (ref 0.3–1)
MONOCYTES NFR BLD: 11.1 % (ref 4–15)
NEUTROPHILS # BLD AUTO: 8.5 K/UL (ref 1.8–7.7)
NEUTROPHILS NFR BLD: 68.9 % (ref 38–73)
NRBC BLD-RTO: 0 /100 WBC
PHOSPHATE SERPL-MCNC: 4.1 MG/DL (ref 2.7–4.5)
PLATELET # BLD AUTO: 618 K/UL (ref 150–350)
PMV BLD AUTO: 9.8 FL (ref 9.2–12.9)
POTASSIUM SERPL-SCNC: 4.4 MMOL/L (ref 3.5–5.1)
PROT SERPL-MCNC: 6.2 G/DL (ref 6–8.4)
RBC # BLD AUTO: 3.6 M/UL (ref 4.6–6.2)
SODIUM SERPL-SCNC: 130 MMOL/L (ref 136–145)
WBC # BLD AUTO: 12.32 K/UL (ref 3.9–12.7)

## 2019-11-18 PROCEDURE — 83735 ASSAY OF MAGNESIUM: CPT

## 2019-11-18 PROCEDURE — 80053 COMPREHEN METABOLIC PANEL: CPT

## 2019-11-18 PROCEDURE — 99239 HOSP IP/OBS DSCHRG MGMT >30: CPT | Mod: ,,, | Performed by: INTERNAL MEDICINE

## 2019-11-18 PROCEDURE — 94664 DEMO&/EVAL PT USE INHALER: CPT

## 2019-11-18 PROCEDURE — 94640 AIRWAY INHALATION TREATMENT: CPT

## 2019-11-18 PROCEDURE — 36415 COLL VENOUS BLD VENIPUNCTURE: CPT

## 2019-11-18 PROCEDURE — 99900035 HC TECH TIME PER 15 MIN (STAT)

## 2019-11-18 PROCEDURE — 94761 N-INVAS EAR/PLS OXIMETRY MLT: CPT

## 2019-11-18 PROCEDURE — 84100 ASSAY OF PHOSPHORUS: CPT

## 2019-11-18 PROCEDURE — 25000003 PHARM REV CODE 250: Performed by: INTERNAL MEDICINE

## 2019-11-18 PROCEDURE — 85025 COMPLETE CBC W/AUTO DIFF WBC: CPT

## 2019-11-18 PROCEDURE — 99239 PR HOSPITAL DISCHARGE DAY,>30 MIN: ICD-10-PCS | Mod: ,,, | Performed by: INTERNAL MEDICINE

## 2019-11-18 PROCEDURE — 25000242 PHARM REV CODE 250 ALT 637 W/ HCPCS: Performed by: INTERNAL MEDICINE

## 2019-11-18 PROCEDURE — 63600175 PHARM REV CODE 636 W HCPCS: Performed by: INTERNAL MEDICINE

## 2019-11-18 PROCEDURE — 27000221 HC OXYGEN, UP TO 24 HOURS

## 2019-11-18 RX ORDER — LANOLIN ALCOHOL/MO/W.PET/CERES
400 CREAM (GRAM) TOPICAL DAILY
Refills: 0 | COMMUNITY
Start: 2019-11-19

## 2019-11-18 RX ORDER — CEFPODOXIME PROXETIL 200 MG/1
200 TABLET, FILM COATED ORAL EVERY 12 HOURS
Qty: 10 TABLET | Refills: 0 | Status: SHIPPED | OUTPATIENT
Start: 2019-11-18 | End: 2019-11-18 | Stop reason: HOSPADM

## 2019-11-18 RX ORDER — CEFDINIR 300 MG/1
300 CAPSULE ORAL 2 TIMES DAILY
Qty: 20 CAPSULE | Refills: 0 | Status: SHIPPED | OUTPATIENT
Start: 2019-11-18 | End: 2019-11-28

## 2019-11-18 RX ADMIN — OXYCODONE HYDROCHLORIDE 5 MG: 5 TABLET ORAL at 02:11

## 2019-11-18 RX ADMIN — CEFTRIAXONE 2 G: 2 INJECTION, SOLUTION INTRAVENOUS at 10:11

## 2019-11-18 RX ADMIN — MAGNESIUM OXIDE TAB 400 MG (241.3 MG ELEMENTAL MG) 400 MG: 400 (241.3 MG) TAB at 10:11

## 2019-11-18 RX ADMIN — IPRATROPIUM BROMIDE AND ALBUTEROL SULFATE 3 ML: .5; 3 SOLUTION RESPIRATORY (INHALATION) at 04:11

## 2019-11-18 RX ADMIN — GUAIFENESIN AND DEXTROMETHORPHAN HYDROBROMIDE 2 TABLET: 600; 30 TABLET, EXTENDED RELEASE ORAL at 10:11

## 2019-11-18 RX ADMIN — IPRATROPIUM BROMIDE AND ALBUTEROL SULFATE 3 ML: .5; 3 SOLUTION RESPIRATORY (INHALATION) at 08:11

## 2019-11-18 RX ADMIN — IPRATROPIUM BROMIDE AND ALBUTEROL SULFATE 3 ML: .5; 3 SOLUTION RESPIRATORY (INHALATION) at 12:11

## 2019-11-18 RX ADMIN — OXYCODONE HYDROCHLORIDE 5 MG: 5 TABLET ORAL at 12:11

## 2019-11-18 RX ADMIN — IPRATROPIUM BROMIDE AND ALBUTEROL SULFATE 3 ML: .5; 3 SOLUTION RESPIRATORY (INHALATION) at 11:11

## 2019-11-18 RX ADMIN — OXYCODONE HYDROCHLORIDE 5 MG: 5 TABLET ORAL at 06:11

## 2019-11-18 NOTE — DISCHARGE SUMMARY
[facility] Hospital Medicine  Discharge Summary      Patient Name: Hakeem Hunt  MRN: [id number]  Admission Date: 11/11/2019  Hospital Length of Stay: 6 days  Discharge Date and Time: 11/18/2019  7:10 PM  Attending Physician: Lorrie Vick MD   Discharging Provider: Lorrie Vick MD  Primary Care Provider: Spencer Rivera MD    [facility] Medicine Team: [facility] MED D Lorrie Vick MD    HPI:  44 y.o. male with no reported medical history presents to the ED complaining of fever and flu-like symptoms for the past week.  + chills, cough productive of bloody sputum, shortness of breath, sore throat, dark-colored urine, unintentional weight loss, headache, lightheadedness, chest pain that is worse with coughing. No recent travels or known sick contacts, no flu vax. He smokes daily, socially drinks alcohol, recreationally uses drugs, last IVDU 2 weeks ago. CXR with large CECILIA consolidation.    * No surgery found *      Hospital Course:  Patient admitted for septic shock likely due to to pneumonia. TTE negative, no vegetations. BCx with NGTD. Patient was weaned off levophed at 5AM on 11/12. Quantiferon gold, AFB culture/smear, respiratory panel, and Legionella labs - all pending at transfer.  Continued on broad spectrum antibiotics and transferred to Hospital Medicine for further care on 11/13.       Septic shock  Left upper lobe pneumonia  Shock ikely due to pneumonia. CXR with diffuse dense opacity of the left upper lobe  - Other possible etiologies include: TB, malignancy, infective endocarditis given his hx of IVDU   - Received vancomycin, Zosyn, azithromycin in ED which were continued initially  - Received 30cc/kg IVF in ED  - Required levophed for pressure support; weaned as tolerated.  - Antibiotics changed to cefepime and vancomycin; and azithromycin started.   - Recent weight loss, night sweats, fever for the last week.  He is homeless and in and out of assisted. Rule out TB.  -  Quantiferon test and collect AFB cultures  - Blood cx x3, and respiratory panel  - TT Echo without vegetations  - UA negative for infection  - HIV negative  - Influenza negative  - Awaiting final results of AFB smears x 3 for discharge; respiratory sputum cultures and viral panel were not received by laboratory.   - De-escalated cephalosporin 11/15.  - QuantiFERON gold for TB and Legionella negative.   - CXR shows worsening consolidation 11/16. Worsening hypoxia.   - WBC down-trending and patient afebrile. Sputum culture with NF, discontinued vancomycin.   - Awaiting 3rd AFB smear. Wean O2 - goal SpO2 > 88%  - AFB negative x 3; SpO2 94% on RA at discharge.     Leukocytosis       Persistent but down-trended      Elevated liver enzymes       Acute hepatitis panel reveals positive Hep C Ab.       Refer to Hep C Clinic.  Hepatitis C PCR negative    Consults:   Consults (From admission, onward)        Status Ordering Provider     Inpatient consult to Critical Care Medicine  Once     Provider:  (Not yet assigned)    Completed DEANA SINGH     Inpatient consult to PICC team ([facility]AS)  Once     Provider:  (Not yet assigned)    Completed ARTURO WISE     Pharmacy to dose Vancomycin consult  Once     Provider:  (Not yet assigned)    Completed ARTURO WISE        Final Active Diagnoses:    Diagnosis Date Noted POA    Polysubstance abuse [F19.10] 11/17/2019 Yes    Pleurisy [R09.1] 11/14/2019 Yes    Hepatitis C antibody positive in blood [R76.8] 11/14/2019 Yes    Tobacco abuse [Z72.0] 11/14/2019 Yes    Left upper lobe pneumonia [J18.1] 11/13/2019 Yes    Elevated liver enzymes [R74.8] 11/13/2019 Yes      Problems Resolved During this Admission:    Diagnosis Date Noted Date Resolved POA    PRINCIPAL PROBLEM:  Septic shock [A41.9, R65.21] 11/12/2019 11/18/2019 Yes    Acute hypoxemic respiratory failure [J96.01] 11/17/2019 11/18/2019 Yes    Leukocytosis [D72.829] 11/12/2019 11/18/2019 Yes      Discharged Condition:  stable    Disposition: Home or Self Care    Follow Up:  Follow-up Information     Spencer Rivera MD On 11/20/2019.    Specialty:  Internal Medicine  Why:  Call to schedule an appointment in 1-2 weeks.   Contact information:  [address] [address] [phone]                 Patient Instructions:      Ambulatory Referral to Hepatitis C   Referral Priority: Routine Referral Type: Consultation   Referral Reason: Specialty Services Required   Requested Specialty: Hepatology   Number of Visits Requested: 1     Diet Adult Regular     Notify your health care provider if you experience any of the following:  temperature >100.4     Notify your health care provider if you experience any of the following:  persistent nausea and vomiting or diarrhea     Notify your health care provider if you experience any of the following:  difficulty breathing or increased cough     Notify your health care provider if you experience any of the following:  persistent dizziness, light-headedness, or visual disturbances     Notify your health care provider if you experience any of the following:  increased confusion or weakness     Activity as tolerated     Medications:  Reconciled Home Medications:      Medication List      START taking these medications    cefdinir 300 MG capsule  Commonly known as:  OMNICEF  Take 1 capsule (300 mg total) by mouth 2 (two) times daily. for 10 days     dextromethorphan-guaifenesin  mg  mg per 12 hr tablet  Commonly known as:  MUCINEX DM  Take 2 tablets by mouth 2 (two) times daily. for 10 days     magnesium oxide 400 mg (241.3 mg magnesium) tablet  Commonly known as:  MAG-OX  Take 1 tablet (400 mg total) by mouth once daily.  Start taking on:  November 19, 2019        STOP taking these medications    HYDROcodone-acetaminophen 7.5-325 mg per tablet  Commonly known as:  NORCO     rifAMpin 150 MG capsule  Commonly known as:  RIFADIN          Significant Diagnostic  Studies:     Significant Labs:  Recent Labs   Lab 11/15/19  0426 11/16/19  0332 11/17/19  0440 11/18/19  0305   WBC 19.15* 14.17* 13.39* 12.32   HGB 10.4* 9.2* 9.2* 10.2*   HCT 31.8* 28.0* 29.5* 31.6*   * 590* 674* 618*     Recent Labs   Lab 11/16/19  0332 11/17/19  0439 11/18/19  0305   * 130* 130*   K 3.9 3.5 4.4    98 99   CO2 24 24 22*   BUN 6 5* 6   CREATININE 0.6 0.6 0.6   GLU 99 118* 103   CALCIUM 7.9* 8.0* 7.9*   MG 2.0 1.9 2.0   PHOS 4.0 3.7 4.1   ALKPHOS 145* 127 119   ALT 71* 66* 80*   AST 29 45* 64*   ALBUMIN 1.4* 1.4* 1.5*   PROT 5.9* 5.8* 6.2   BILITOT 0.4 0.2 0.2       Ref. Range 11/15/2019 04:26   HCV Log Latest Ref Range: <1.08 Log (10) IU/mL <1.08   HCV RNA Quant PCR LOG Latest Ref Range: <12 IU/mL <12     Results for orders placed or performed during the hospital encounter of 11/11/19   X-Ray Chest PA And Lateral    Narrative    EXAMINATION:  XR CHEST PA AND LATERAL    CLINICAL HISTORY:  hypoxia;    TECHNIQUE:  PA and lateral views of the chest were performed.    COMPARISON:  11/16/2019    FINDINGS:  There is consolidation of the superior aspect of the left pulmonary parenchyma.  Compared with what was seen on 11/16/2019 overall pulmonary aeration has improved.      Impression    Compared with what was seen on 11/16/2019 pulmonary aeration aeration, predominately at the left lung base has improved.      Electronically signed by: Debbie Villarreal MD  Date:    11/18/2019  Time:    12:30     Indwelling Lines/Drains at time of discharge: none    Time spent on the discharge of patient: 50 minutes  Patient was seen and examined on the date of discharge and determined to be suitable for discharge.      Lorrie Vick MD  Department of Hospital Medicine  [facility]

## 2019-11-18 NOTE — CONSULTS
Therapy with Vancomycin complete and consult discontinued by provider.  Pharmacy will sign off, please re-consult as needed.    Thank you,  Radha Ibrahim, PharmD  [phone]

## 2019-11-18 NOTE — PLAN OF CARE
POC reviewed with pt, acknowledged understanding. Pt AAO x 4. Pt remains free of falls/injuries. Pt on telemetry remained low ST/SR. Pt tolerating regular diet. Pt pain controlled with prescribed meds. Pt ambulates in the room. Pt reminded to wear nasal canula, pt keeps removing it. No acute events throughout shift. No distress noted, will continue to monitor.

## 2019-11-18 NOTE — PLAN OF CARE
0915  CM requested that the patient's nurse, Sean ([id number]) wean patient's O2 with plan to discharge later today.    1205  Patient awake & alert eating lunch in bed when CM rounded. No family/friends present. Patient stated that he will discharge to his friend's house, [place] [address] [place] ([place], [place], [phone]), following discharge & will not need assistance with transportation. Voicemail message left for manuela Rivera's (PCP) nurse requesting a hospital follow up appointment in 1-2 weeks. Dr. Rivera's nurse to call the patient with appointment date & time.     1605  CM was informed by the nurse Sean that the patient's pox was 92% on RA. CM informed Dr. Vick of above. Will continue to follow.

## 2019-11-18 NOTE — PLAN OF CARE
Pt AAOx4. VSS at this time. No significant events occurred on this shift. Possible DC in AM. POC reviewed with pt who verbalized understanding.

## 2019-11-19 LAB
ENTEROVIRUS: NOT DETECTED
HUMAN BOCAVIRUS: NOT DETECTED
HUMAN CORONAVIRUS, COMMON COLD VIRUS: NOT DETECTED
INFLUENZA A - H1N1-09: NOT DETECTED
PARAINFLUENZA: NOT DETECTED
RVP - ADENOVIRUS: NOT DETECTED
RVP - HUMAN METAPNEUMOVIRUS (HMPV): NOT DETECTED
RVP - INFLUENZA A: NOT DETECTED
RVP - INFLUENZA B: NOT DETECTED
RVP - RESPIRATORY SYNCTIAL VIRUS (RSV) A: NOT DETECTED
RVP - RESPIRATORY VIRAL PANEL, SOURCE: NORMAL
RVP - RHINOVIRUS: NOT DETECTED

## 2019-11-20 LAB
BACTERIA BLD CULT: NORMAL
BACTERIA BLD CULT: NORMAL

## 2019-11-20 NOTE — PLAN OF CARE
11/20/19 0744   Final Note   Assessment Type Final Discharge Note     Patient discharged to a friend's house with no needs on 11/18/19.

## 2019-12-05 ENCOUNTER — DOCUMENTATION ONLY (OUTPATIENT)
Dept: TRANSPLANT | Facility: CLINIC | Age: 44
End: 2019-12-05

## 2019-12-05 NOTE — NURSING
Pt records reviewed.   Pt will be referred to Hepatitis C clinic  Hepatitis C antibody positive in blood  Initial referral received  from the workque.   Referring Provider/diagnosis  Lorrie Vick MD    Referral letter sent to  patient.

## 2019-12-05 NOTE — LETTER
December 5, 2019    Hakeem Hunt  [address] [place] [address]      Dear Hakeem Hunt:    Your doctor has referred you to the [facility]. We are sending this letter to remind you to make an appointment with us to complete the referral process.     Please call us at [phone] to schedule an appointment. We look forward to seeing you soon.     If you received a call and have been scheduled, please disregard this letter.       Sincerely,        [facility]   [address] [place] [id number]  [phone]

## 2019-12-06 LAB — LEGIONELLA CULTURE: NORMAL

## 2020-01-14 LAB
ACID FAST MOD KINY STN SPEC: NORMAL
MYCOBACTERIUM SPEC QL CULT: NORMAL

## 2020-01-17 LAB
ACID FAST MOD KINY STN SPEC: NORMAL
MYCOBACTERIUM SPEC QL CULT: NORMAL

## 2020-01-18 LAB
ACID FAST MOD KINY STN SPEC: NORMAL
MYCOBACTERIUM SPEC QL CULT: NORMAL

## 2021-05-03 NOTE — ED NOTES
6 Bed: 05  Expected date: 11/11/19  Expected time:   Means of arrival:   Comments:  Pt still in room

## 2021-12-29 ENCOUNTER — HOSPITAL ENCOUNTER (EMERGENCY)
Facility: HOSPITAL | Age: 46
Discharge: LEFT BEFORE DISCHARGE | End: 2021-12-29
Attending: EMERGENCY MEDICINE
Payer: MEDICAID

## 2021-12-29 VITALS
RESPIRATION RATE: 19 BRPM | DIASTOLIC BLOOD PRESSURE: 88 MMHG | OXYGEN SATURATION: 99 % | TEMPERATURE: 99 F | SYSTOLIC BLOOD PRESSURE: 160 MMHG | HEART RATE: 101 BPM

## 2021-12-29 DIAGNOSIS — U07.1 COVID-19 VIRUS INFECTION: Primary | ICD-10-CM

## 2021-12-29 DIAGNOSIS — R06.02 SHORTNESS OF BREATH: ICD-10-CM

## 2021-12-29 LAB
CTP QC/QA: YES
SARS-COV-2 RDRP RESP QL NAA+PROBE: POSITIVE

## 2021-12-29 PROCEDURE — U0002 COVID-19 LAB TEST NON-CDC: HCPCS | Performed by: EMERGENCY MEDICINE

## 2021-12-29 PROCEDURE — 99284 EMERGENCY DEPT VISIT MOD MDM: CPT | Mod: CR,CS,, | Performed by: PHYSICIAN ASSISTANT

## 2021-12-29 PROCEDURE — 99284 PR EMERGENCY DEPT VISIT,LEVEL IV: ICD-10-PCS | Mod: CR,CS,, | Performed by: PHYSICIAN ASSISTANT

## 2021-12-29 PROCEDURE — 99282 EMERGENCY DEPT VISIT SF MDM: CPT | Mod: 25

## 2021-12-30 NOTE — ED PROVIDER NOTES
"Encounter Date: 12/29/2021       History     Chief Complaint   Patient presents with    COVID-19 Concerns     +home test, reports fever earlier and took tylenol     46-year-old male with a reported history of COPD presents to the ED with COVID-19 symptoms.  Patient reports fever, cough, headache.  Patient also reports that he has chronic shortness of breath and it feels worse than his baseline.  Cough is productive of green mucus.  He is not vaccinated for COVID.  No known exposure.          Review of patient's allergies indicates:   Allergen Reactions    Pcn [penicillins] Rash     No past medical history on file.  Past Surgical History:   Procedure Laterality Date    HERNIA REPAIR       No family history on file.  Social History     Tobacco Use    Smoking status: Current Every Day Smoker     Packs/day: 1.00     Types: Cigarettes   Substance Use Topics    Alcohol use: Yes     Comment: 2 beers after work    Drug use: Yes     Types: Marijuana, Cocaine, IV     Comment: "play around with just about all of it"     Review of Systems   Constitutional: Positive for fever.   HENT: Negative for sore throat.    Respiratory: Positive for cough and shortness of breath.    Cardiovascular: Negative for chest pain.   Gastrointestinal: Negative for nausea.   Genitourinary: Negative for dysuria.   Musculoskeletal: Negative for back pain.   Skin: Negative for rash.   Neurological: Negative for weakness.   Hematological: Does not bruise/bleed easily.       Physical Exam     Initial Vitals [12/29/21 2008]   BP Pulse Resp Temp SpO2   (!) 160/88 101 18 98.5 °F (36.9 °C) 98 %      MAP       --         Physical Exam    Nursing note and vitals reviewed.  Constitutional: He appears well-developed and well-nourished.  Non-toxic appearance. He does not appear ill. No distress.   HENT:   Head: Normocephalic and atraumatic.   Neck: Neck supple.   Normal range of motion.  Cardiovascular: Normal rate and regular rhythm. Exam reveals no " gallop, no distant heart sounds and no friction rub.    No murmur heard.  Pulmonary/Chest: Effort normal. No accessory muscle usage. No tachypnea. No respiratory distress. He has decreased breath sounds. He has no wheezes. He has no rhonchi. He has no rales.   Abdominal: He exhibits no distension.   Musculoskeletal:      Cervical back: Normal range of motion and neck supple.     Neurological: He is alert.   Skin: No rash noted.   Psychiatric: He is agitated.         ED Course   Procedures  Labs Reviewed   SARS-COV-2 RDRP GENE - Abnormal; Notable for the following components:       Result Value    POC Rapid COVID Positive (*)     All other components within normal limits    Narrative:     This test utilizes isothermal nucleic acid amplification   technology to detect the SARS-CoV-2 RdRp nucleic acid segment.   The analytical sensitivity (limit of detection) is 125 genome   equivalents/mL.   A POSITIVE result implies infection with the SARS-CoV-2 virus;   the patient is presumed to be contagious.     A NEGATIVE result means that SARS-CoV-2 nucleic acids are not   present above the limit of detection. A NEGATIVE result should be   treated as presumptive. It does not rule out the possibility of   COVID-19 and should not be the sole basis for treatment decisions.   If COVID-19 is strongly suspected based on clinical and exposure   history, re-testing using an alternate molecular assay should be   considered.   This test is only for use under the Food and Drug   Administration s Emergency Use Authorization (EUA).   Commercial kits are provided by [employer].   Performance characteristics of the EUA have been independently   verified by [facility] Department of   Pathology and Laboratory Medicine.   _________________________________________________________________   The authorized Fact Sheet for Healthcare Providers and the authorized Fact   Sheet for Patients of the ID NOW COVID-19 are available on the  "FDA   website:     https://www.fda.gov/media/102139/download  https://www.fda.gov/media/912822/download              Imaging Results    None          Medications - No data to display  Medical Decision Making:   History:   Old Medical Records: I decided to obtain old medical records.  Initial Assessment:   Patient presents with COVID-19 symptoms.  Appears in no acute distress.  BP is 160/88.  Mild tachycardia noted.  Vitals otherwise within normal limits.  He is afebrile.  Denies any fever reducing medications prior to arrival.  Lung sounds decreased throughout.  Differential Diagnosis:   My differential diagnosis includes but is not limited to:  COVID, influenza, pneumonia, COPD exacerbation, URI   Clinical Tests:   Lab Tests: Ordered  ED Management:  COVID test is positive. Resulted during physical exam.  When patient notified of results, he states that he cannot go home because his father is sick. Pt arrived with an overnight bag. Unclear if he was anticipating admission. He became agitated when I advised him that he would not be able to stay in the emergency department and nursing advised that he may need to stay in a shelter, he angrily got up and walked out eloping from the ED prior to work up.              ED Course as of 12/30/21 1235   Wed Dec 29, 2021   2040 SARS-CoV-2 RNA, Amplification, Qual(!): Positive [EH]   2040 Pt stated that we "aren't doing anything for me" and got up and left the ED prior to obtaining cxr and ekg.  [EH]      ED Course User Index  [EH] Gela Merlos PA-C             Clinical Impression:   Final diagnoses:  [R06.02] Shortness of breath  [U07.1] COVID-19 virus infection (Primary)          ED Disposition Condition    Eloped               Gela Merlos PA-C  12/30/21 1235    "